# Patient Record
Sex: MALE | URBAN - METROPOLITAN AREA
[De-identification: names, ages, dates, MRNs, and addresses within clinical notes are randomized per-mention and may not be internally consistent; named-entity substitution may affect disease eponyms.]

---

## 2022-05-12 ENCOUNTER — LAB OUTSIDE AN ENCOUNTER (OUTPATIENT)
Dept: URBAN - METROPOLITAN AREA CLINIC 118 | Facility: CLINIC | Age: 67
End: 2022-05-12

## 2022-05-12 ENCOUNTER — OFFICE VISIT (OUTPATIENT)
Dept: URBAN - METROPOLITAN AREA CLINIC 118 | Facility: CLINIC | Age: 67
End: 2022-05-12
Payer: COMMERCIAL

## 2022-05-12 ENCOUNTER — DASHBOARD ENCOUNTERS (OUTPATIENT)
Age: 67
End: 2022-05-12

## 2022-05-12 DIAGNOSIS — Z12.11 SCREEN FOR COLON CANCER: ICD-10-CM

## 2022-05-12 DIAGNOSIS — Z86.010 PERSONAL HISTORY OF COLONIC POLYPS: ICD-10-CM

## 2022-05-12 PROBLEM — 428283002: Status: ACTIVE | Noted: 2022-05-12

## 2022-05-12 PROCEDURE — 99203 OFFICE O/P NEW LOW 30 MIN: CPT | Performed by: INTERNAL MEDICINE

## 2022-05-12 NOTE — HPI-TODAY'S VISIT:
pt presents for colon cancer screening. pt reports h/o polyps. pt denies specific GI complaints at this time. Pt denies diarrhea, constipation or rectal bleeding. No weight loss or anemia. No UGI complaints including nausea, vomiting, gerd or dysphagia. Last colonoscopy with polyps 2017.

## 2022-06-15 ENCOUNTER — OFFICE VISIT (OUTPATIENT)
Dept: URBAN - METROPOLITAN AREA SURGERY CENTER 23 | Facility: SURGERY CENTER | Age: 67
End: 2022-06-15
Payer: COMMERCIAL

## 2022-06-15 DIAGNOSIS — Z86.010 ADENOMAS PERSONAL HISTORY OF COLONIC POLYPS: ICD-10-CM

## 2022-06-15 DIAGNOSIS — Z12.11 COLON CANCER SCREENING: ICD-10-CM

## 2022-06-15 PROCEDURE — G0105 COLORECTAL SCRN; HI RISK IND: HCPCS | Performed by: INTERNAL MEDICINE

## 2022-06-15 PROCEDURE — G8907 PT DOC NO EVENTS ON DISCHARG: HCPCS | Performed by: INTERNAL MEDICINE

## 2024-05-16 ENCOUNTER — APPOINTMENT (OUTPATIENT)
Dept: RADIOLOGY | Facility: HOSPITAL | Age: 69
DRG: 029 | End: 2024-05-16
Payer: MEDICARE

## 2024-05-16 ENCOUNTER — CLINICAL SUPPORT (OUTPATIENT)
Dept: EMERGENCY MEDICINE | Facility: HOSPITAL | Age: 69
DRG: 029 | End: 2024-05-16
Payer: MEDICARE

## 2024-05-16 ENCOUNTER — HOSPITAL ENCOUNTER (INPATIENT)
Facility: HOSPITAL | Age: 69
LOS: 10 days | Discharge: HOME | DRG: 029 | End: 2024-05-27
Attending: EMERGENCY MEDICINE | Admitting: SURGERY
Payer: MEDICARE

## 2024-05-16 DIAGNOSIS — M48.02 CERVICAL STENOSIS OF SPINAL CANAL: ICD-10-CM
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
## 2024-05-16 DIAGNOSIS — R55 SYNCOPE, UNSPECIFIED SYNCOPE TYPE: ICD-10-CM

## 2024-05-16 DIAGNOSIS — F14.10 COCAINE ABUSE (MULTI): ICD-10-CM

## 2024-05-16 DIAGNOSIS — S14.129A: ICD-10-CM

## 2024-05-16 DIAGNOSIS — S14.129A CENTRAL CORD SYNDROME, INITIAL ENCOUNTER (MULTI): ICD-10-CM

## 2024-05-16 DIAGNOSIS — G95.20 CERVICAL CORD COMPRESSION WITH MYELOPATHY (MULTI): ICD-10-CM

## 2024-05-16 LAB
ABO GROUP (TYPE) IN BLOOD: NORMAL
ABO GROUP (TYPE) IN BLOOD: NORMAL
ALBUMIN SERPL BCP-MCNC: 3.8 G/DL (ref 3.4–5)
ALP SERPL-CCNC: 83 U/L (ref 33–136)
ALT SERPL W P-5'-P-CCNC: 11 U/L (ref 10–52)
AMPHETAMINES UR QL SCN: ABNORMAL
ANION GAP BLDV CALCULATED.4IONS-SCNC: 5 MMOL/L (ref 10–25)
ANION GAP SERPL CALC-SCNC: 14 MMOL/L (ref 10–20)
ANTIBODY SCREEN: NORMAL
AST SERPL W P-5'-P-CCNC: 16 U/L (ref 9–39)
ATRIAL RATE: 84 BPM
BARBITURATES UR QL SCN: ABNORMAL
BASE EXCESS BLDV CALC-SCNC: 3.7 MMOL/L (ref -2–3)
BASOPHILS # BLD MANUAL: 0.06 X10*3/UL (ref 0–0.1)
BASOPHILS NFR BLD MANUAL: 0.9 %
BENZODIAZ UR QL SCN: ABNORMAL
BILIRUB SERPL-MCNC: 0.8 MG/DL (ref 0–1.2)
BODY TEMPERATURE: 37 DEGREES CELSIUS
BUN SERPL-MCNC: 12 MG/DL (ref 6–23)
BZE UR QL SCN: ABNORMAL
CA-I BLDV-SCNC: 1.1 MMOL/L (ref 1.1–1.33)
CALCIUM SERPL-MCNC: 8.2 MG/DL (ref 8.6–10.6)
CANNABINOIDS UR QL SCN: ABNORMAL
CARDIAC TROPONIN I PNL SERPL HS: 6 NG/L (ref 0–53)
CHLORIDE BLDV-SCNC: 104 MMOL/L (ref 98–107)
CHLORIDE SERPL-SCNC: 102 MMOL/L (ref 98–107)
CO2 SERPL-SCNC: 26 MMOL/L (ref 21–32)
CREAT SERPL-MCNC: 0.97 MG/DL (ref 0.5–1.3)
EGFRCR SERPLBLD CKD-EPI 2021: 85 ML/MIN/1.73M*2
EOSINOPHIL # BLD MANUAL: 0.06 X10*3/UL (ref 0–0.7)
EOSINOPHIL NFR BLD MANUAL: 0.9 %
ERYTHROCYTE [DISTWIDTH] IN BLOOD BY AUTOMATED COUNT: 14.4 % (ref 11.5–14.5)
ETHANOL SERPL-MCNC: <10 MG/DL
FENTANYL+NORFENTANYL UR QL SCN: ABNORMAL
GLUCOSE BLD MANUAL STRIP-MCNC: 110 MG/DL (ref 74–99)
GLUCOSE BLD MANUAL STRIP-MCNC: 76 MG/DL (ref 74–99)
GLUCOSE BLDV-MCNC: 87 MG/DL (ref 74–99)
GLUCOSE SERPL-MCNC: 82 MG/DL (ref 74–99)
HCO3 BLDV-SCNC: 29.7 MMOL/L (ref 22–26)
HCT VFR BLD AUTO: 38.6 % (ref 41–52)
HCT VFR BLD EST: 41 % (ref 41–52)
HGB BLD-MCNC: 13.4 G/DL (ref 13.5–17.5)
HGB BLDV-MCNC: 13.6 G/DL (ref 13.5–17.5)
IMM GRANULOCYTES # BLD AUTO: 0.03 X10*3/UL (ref 0–0.7)
IMM GRANULOCYTES NFR BLD AUTO: 0.5 % (ref 0–0.9)
INR PPP: 0.9 (ref 0.9–1.1)
LACTATE BLDV-SCNC: 1 MMOL/L (ref 0.4–2)
LYMPHOCYTES # BLD MANUAL: 2.53 X10*3/UL (ref 1.2–4.8)
LYMPHOCYTES NFR BLD MANUAL: 39.5 %
MCH RBC QN AUTO: 29.9 PG (ref 26–34)
MCHC RBC AUTO-ENTMCNC: 34.7 G/DL (ref 32–36)
MCV RBC AUTO: 86 FL (ref 80–100)
METHADONE UR QL SCN: ABNORMAL
MONOCYTES # BLD MANUAL: 0.9 X10*3/UL (ref 0.1–1)
MONOCYTES NFR BLD MANUAL: 14 %
NEUTS SEG # BLD MANUAL: 2.86 X10*3/UL (ref 1.2–7)
NEUTS SEG NFR BLD MANUAL: 44.7 %
NRBC BLD-RTO: 0 /100 WBCS (ref 0–0)
OPIATES UR QL SCN: ABNORMAL
OXYCODONE+OXYMORPHONE UR QL SCN: ABNORMAL
OXYHGB MFR BLDV: 49.3 % (ref 45–75)
P AXIS: 81 DEGREES
P OFFSET: 187 MS
P ONSET: 141 MS
PCO2 BLDV: 49 MM HG (ref 41–51)
PCP UR QL SCN: ABNORMAL
PH BLDV: 7.39 PH (ref 7.33–7.43)
PLATELET # BLD AUTO: 80 X10*3/UL (ref 150–450)
PO2 BLDV: 32 MM HG (ref 35–45)
POTASSIUM BLDV-SCNC: 4.4 MMOL/L (ref 3.5–5.3)
POTASSIUM SERPL-SCNC: 3.6 MMOL/L (ref 3.5–5.3)
PR INTERVAL: 170 MS
PROT SERPL-MCNC: 6.9 G/DL (ref 6.4–8.2)
PROTHROMBIN TIME: 10.6 SECONDS (ref 9.8–12.8)
Q ONSET: 226 MS
QRS COUNT: 13 BEATS
QRS DURATION: 70 MS
QT INTERVAL: 390 MS
QTC CALCULATION(BAZETT): 460 MS
QTC FREDERICIA: 436 MS
R AXIS: -18 DEGREES
RBC # BLD AUTO: 4.48 X10*6/UL (ref 4.5–5.9)
RBC MORPH BLD: NORMAL
RH FACTOR (ANTIGEN D): NORMAL
RH FACTOR (ANTIGEN D): NORMAL
SAO2 % BLDV: 54 % (ref 45–75)
SODIUM BLDV-SCNC: 134 MMOL/L (ref 136–145)
SODIUM SERPL-SCNC: 138 MMOL/L (ref 136–145)
T AXIS: 69 DEGREES
T OFFSET: 421 MS
TOTAL CELLS COUNTED BLD: 114
VENTRICULAR RATE: 84 BPM
WBC # BLD AUTO: 6.4 X10*3/UL (ref 4.4–11.3)

## 2024-05-16 PROCEDURE — 74177 CT ABD & PELVIS W/CONTRAST: CPT | Performed by: RADIOLOGY

## 2024-05-16 PROCEDURE — 82947 ASSAY GLUCOSE BLOOD QUANT: CPT | Mod: 91

## 2024-05-16 PROCEDURE — G0390 TRAUMA RESPONS W/HOSP CRITI: HCPCS

## 2024-05-16 PROCEDURE — 86901 BLOOD TYPING SEROLOGIC RH(D): CPT | Performed by: EMERGENCY MEDICINE

## 2024-05-16 PROCEDURE — 72170 X-RAY EXAM OF PELVIS: CPT

## 2024-05-16 PROCEDURE — 2550000001 HC RX 255 CONTRASTS: Performed by: EMERGENCY MEDICINE

## 2024-05-16 PROCEDURE — 36415 COLL VENOUS BLD VENIPUNCTURE: CPT | Performed by: EMERGENCY MEDICINE

## 2024-05-16 PROCEDURE — 2500000004 HC RX 250 GENERAL PHARMACY W/ HCPCS (ALT 636 FOR OP/ED): Mod: SE | Performed by: NURSE PRACTITIONER

## 2024-05-16 PROCEDURE — 99291 CRITICAL CARE FIRST HOUR: CPT | Mod: 25 | Performed by: EMERGENCY MEDICINE

## 2024-05-16 PROCEDURE — 70486 CT MAXILLOFACIAL W/O DYE: CPT

## 2024-05-16 PROCEDURE — 96360 HYDRATION IV INFUSION INIT: CPT

## 2024-05-16 PROCEDURE — 71260 CT THORAX DX C+: CPT | Performed by: RADIOLOGY

## 2024-05-16 PROCEDURE — 72131 CT LUMBAR SPINE W/O DYE: CPT | Performed by: RADIOLOGY

## 2024-05-16 PROCEDURE — 93005 ELECTROCARDIOGRAM TRACING: CPT

## 2024-05-16 PROCEDURE — 71045 X-RAY EXAM CHEST 1 VIEW: CPT

## 2024-05-16 PROCEDURE — 71045 X-RAY EXAM CHEST 1 VIEW: CPT | Performed by: RADIOLOGY

## 2024-05-16 PROCEDURE — 80053 COMPREHEN METABOLIC PANEL: CPT | Performed by: EMERGENCY MEDICINE

## 2024-05-16 PROCEDURE — 36415 COLL VENOUS BLD VENIPUNCTURE: CPT

## 2024-05-16 PROCEDURE — 72050 X-RAY EXAM NECK SPINE 4/5VWS: CPT | Performed by: INTERNAL MEDICINE

## 2024-05-16 PROCEDURE — 99221 1ST HOSP IP/OBS SF/LOW 40: CPT | Performed by: ORTHOPAEDIC SURGERY

## 2024-05-16 PROCEDURE — 82947 ASSAY GLUCOSE BLOOD QUANT: CPT

## 2024-05-16 PROCEDURE — 2500000004 HC RX 250 GENERAL PHARMACY W/ HCPCS (ALT 636 FOR OP/ED): Mod: SE | Performed by: PHYSICIAN ASSISTANT

## 2024-05-16 PROCEDURE — 96361 HYDRATE IV INFUSION ADD-ON: CPT

## 2024-05-16 PROCEDURE — 76376 3D RENDER W/INTRP POSTPROCES: CPT

## 2024-05-16 PROCEDURE — 72125 CT NECK SPINE W/O DYE: CPT

## 2024-05-16 PROCEDURE — 72050 X-RAY EXAM NECK SPINE 4/5VWS: CPT

## 2024-05-16 PROCEDURE — 72128 CT CHEST SPINE W/O DYE: CPT | Mod: RCN

## 2024-05-16 PROCEDURE — 82077 ASSAY SPEC XCP UR&BREATH IA: CPT | Performed by: EMERGENCY MEDICINE

## 2024-05-16 PROCEDURE — 72156 MRI NECK SPINE W/O & W/DYE: CPT

## 2024-05-16 PROCEDURE — 85027 COMPLETE CBC AUTOMATED: CPT | Performed by: EMERGENCY MEDICINE

## 2024-05-16 PROCEDURE — G0378 HOSPITAL OBSERVATION PER HR: HCPCS

## 2024-05-16 PROCEDURE — 72156 MRI NECK SPINE W/O & W/DYE: CPT | Performed by: RADIOLOGY

## 2024-05-16 PROCEDURE — 85610 PROTHROMBIN TIME: CPT | Performed by: EMERGENCY MEDICINE

## 2024-05-16 PROCEDURE — 80307 DRUG TEST PRSMV CHEM ANLYZR: CPT | Performed by: PHYSICIAN ASSISTANT

## 2024-05-16 PROCEDURE — 84132 ASSAY OF SERUM POTASSIUM: CPT | Mod: 91

## 2024-05-16 PROCEDURE — 70553 MRI BRAIN STEM W/O & W/DYE: CPT

## 2024-05-16 PROCEDURE — 72128 CT CHEST SPINE W/O DYE: CPT | Performed by: RADIOLOGY

## 2024-05-16 PROCEDURE — 72131 CT LUMBAR SPINE W/O DYE: CPT | Mod: RCN

## 2024-05-16 PROCEDURE — 70450 CT HEAD/BRAIN W/O DYE: CPT

## 2024-05-16 PROCEDURE — 84484 ASSAY OF TROPONIN QUANT: CPT | Performed by: EMERGENCY MEDICINE

## 2024-05-16 PROCEDURE — 85007 BL SMEAR W/DIFF WBC COUNT: CPT | Performed by: EMERGENCY MEDICINE

## 2024-05-16 PROCEDURE — 99291 CRITICAL CARE FIRST HOUR: CPT | Performed by: EMERGENCY MEDICINE

## 2024-05-16 PROCEDURE — 70553 MRI BRAIN STEM W/O & W/DYE: CPT | Performed by: RADIOLOGY

## 2024-05-16 PROCEDURE — 93010 ELECTROCARDIOGRAM REPORT: CPT | Performed by: EMERGENCY MEDICINE

## 2024-05-16 PROCEDURE — 72170 X-RAY EXAM OF PELVIS: CPT | Performed by: RADIOLOGY

## 2024-05-16 PROCEDURE — 71260 CT THORAX DX C+: CPT

## 2024-05-16 PROCEDURE — 2550000001 HC RX 255 CONTRASTS: Mod: SE | Performed by: EMERGENCY MEDICINE

## 2024-05-16 PROCEDURE — A9575 INJ GADOTERATE MEGLUMI 0.1ML: HCPCS | Mod: SE | Performed by: EMERGENCY MEDICINE

## 2024-05-16 RX ORDER — SODIUM CHLORIDE, SODIUM LACTATE, POTASSIUM CHLORIDE, CALCIUM CHLORIDE 600; 310; 30; 20 MG/100ML; MG/100ML; MG/100ML; MG/100ML
100 INJECTION, SOLUTION INTRAVENOUS CONTINUOUS
Status: DISCONTINUED | OUTPATIENT
Start: 2024-05-16 | End: 2024-05-17

## 2024-05-16 RX ORDER — DOCUSATE SODIUM 100 MG/1
100 CAPSULE, LIQUID FILLED ORAL 2 TIMES DAILY
Status: DISCONTINUED | OUTPATIENT
Start: 2024-05-16 | End: 2024-05-27 | Stop reason: HOSPADM

## 2024-05-16 RX ORDER — GADOTERATE MEGLUMINE 376.9 MG/ML
12 INJECTION INTRAVENOUS
Status: COMPLETED | OUTPATIENT
Start: 2024-05-16 | End: 2024-05-16

## 2024-05-16 RX ORDER — ACETAMINOPHEN 325 MG/1
650 TABLET ORAL EVERY 6 HOURS PRN
Status: DISCONTINUED | OUTPATIENT
Start: 2024-05-16 | End: 2024-05-27 | Stop reason: HOSPADM

## 2024-05-16 RX ORDER — SENNOSIDES 8.6 MG/1
1 TABLET ORAL 2 TIMES DAILY
Status: DISCONTINUED | OUTPATIENT
Start: 2024-05-16 | End: 2024-05-27 | Stop reason: HOSPADM

## 2024-05-16 RX ORDER — POLYETHYLENE GLYCOL 3350 17 G/17G
17 POWDER, FOR SOLUTION ORAL DAILY PRN
Status: DISCONTINUED | OUTPATIENT
Start: 2024-05-16 | End: 2024-05-22

## 2024-05-16 RX ORDER — HYDRALAZINE HYDROCHLORIDE 20 MG/ML
10 INJECTION INTRAMUSCULAR; INTRAVENOUS ONCE AS NEEDED
Status: COMPLETED | OUTPATIENT
Start: 2024-05-16 | End: 2024-05-16

## 2024-05-16 RX ADMIN — SODIUM CHLORIDE, POTASSIUM CHLORIDE, SODIUM LACTATE AND CALCIUM CHLORIDE 100 ML/HR: 600; 310; 30; 20 INJECTION, SOLUTION INTRAVENOUS at 21:38

## 2024-05-16 RX ADMIN — SODIUM CHLORIDE, POTASSIUM CHLORIDE, SODIUM LACTATE AND CALCIUM CHLORIDE 100 ML/HR: 600; 310; 30; 20 INJECTION, SOLUTION INTRAVENOUS at 16:36

## 2024-05-16 RX ADMIN — GADOTERATE MEGLUMINE 12 ML: 376.9 INJECTION INTRAVENOUS at 09:59

## 2024-05-16 RX ADMIN — HYDRALAZINE HYDROCHLORIDE 10 MG: 20 INJECTION INTRAMUSCULAR; INTRAVENOUS at 20:27

## 2024-05-16 RX ADMIN — IOHEXOL 100 ML: 350 INJECTION, SOLUTION INTRAVENOUS at 02:20

## 2024-05-16 SDOH — SOCIAL STABILITY: SOCIAL INSECURITY: DO YOU FEEL UNSAFE GOING BACK TO THE PLACE WHERE YOU ARE LIVING?: NO

## 2024-05-16 SDOH — SOCIAL STABILITY: SOCIAL INSECURITY: WERE YOU ABLE TO COMPLETE ALL THE BEHAVIORAL HEALTH SCREENINGS?: YES

## 2024-05-16 SDOH — SOCIAL STABILITY: SOCIAL INSECURITY: DOES ANYONE TRY TO KEEP YOU FROM HAVING/CONTACTING OTHER FRIENDS OR DOING THINGS OUTSIDE YOUR HOME?: NO

## 2024-05-16 SDOH — SOCIAL STABILITY: SOCIAL INSECURITY: HAVE YOU HAD ANY THOUGHTS OF HARMING ANYONE ELSE?: NO

## 2024-05-16 SDOH — SOCIAL STABILITY: SOCIAL INSECURITY: ARE THERE ANY APPARENT SIGNS OF INJURIES/BEHAVIORS THAT COULD BE RELATED TO ABUSE/NEGLECT?: NO

## 2024-05-16 SDOH — SOCIAL STABILITY: SOCIAL INSECURITY: HAS ANYONE EVER THREATENED TO HURT YOUR FAMILY OR YOUR PETS?: NO

## 2024-05-16 SDOH — SOCIAL STABILITY: SOCIAL INSECURITY: ABUSE: ADULT

## 2024-05-16 SDOH — SOCIAL STABILITY: SOCIAL INSECURITY: DO YOU FEEL ANYONE HAS EXPLOITED OR TAKEN ADVANTAGE OF YOU FINANCIALLY OR OF YOUR PERSONAL PROPERTY?: NO

## 2024-05-16 SDOH — SOCIAL STABILITY: SOCIAL INSECURITY: HAVE YOU HAD THOUGHTS OF HARMING ANYONE ELSE?: NO

## 2024-05-16 SDOH — SOCIAL STABILITY: SOCIAL INSECURITY: ARE YOU OR HAVE YOU BEEN THREATENED OR ABUSED PHYSICALLY, EMOTIONALLY, OR SEXUALLY BY ANYONE?: NO

## 2024-05-16 ASSESSMENT — ACTIVITIES OF DAILY LIVING (ADL)
BATHING: INDEPENDENT
JUDGMENT_ADEQUATE_SAFELY_COMPLETE_DAILY_ACTIVITIES: YES
FEEDING YOURSELF: INDEPENDENT
ADEQUATE_TO_COMPLETE_ADL: YES
DRESSING YOURSELF: INDEPENDENT
HEARING - LEFT EAR: FUNCTIONAL
WALKS IN HOME: INDEPENDENT
HEARING - RIGHT EAR: FUNCTIONAL
GROOMING: INDEPENDENT
TOILETING: INDEPENDENT
PATIENT'S MEMORY ADEQUATE TO SAFELY COMPLETE DAILY ACTIVITIES?: YES
LACK_OF_TRANSPORTATION: NO

## 2024-05-16 ASSESSMENT — LIFESTYLE VARIABLES
HOW MANY STANDARD DRINKS CONTAINING ALCOHOL DO YOU HAVE ON A TYPICAL DAY: PATIENT DOES NOT DRINK
HOW OFTEN DO YOU HAVE A DRINK CONTAINING ALCOHOL: NEVER
EVER HAD A DRINK FIRST THING IN THE MORNING TO STEADY YOUR NERVES TO GET RID OF A HANGOVER: NO
AUDIT-C TOTAL SCORE: 0
HAVE PEOPLE ANNOYED YOU BY CRITICIZING YOUR DRINKING: NO
PRESCIPTION_ABUSE_PAST_12_MONTHS: NO
SKIP TO QUESTIONS 9-10: 1
TOTAL SCORE: 0
EVER FELT BAD OR GUILTY ABOUT YOUR DRINKING: NO
HOW OFTEN DO YOU HAVE 6 OR MORE DRINKS ON ONE OCCASION: NEVER
AUDIT-C TOTAL SCORE: 0
HAVE YOU EVER FELT YOU SHOULD CUT DOWN ON YOUR DRINKING: NO
SUBSTANCE_ABUSE_PAST_12_MONTHS: YES

## 2024-05-16 ASSESSMENT — COGNITIVE AND FUNCTIONAL STATUS - GENERAL
DAILY ACTIVITIY SCORE: 24
MOBILITY SCORE: 24
PATIENT BASELINE BEDBOUND: NO

## 2024-05-16 ASSESSMENT — PATIENT HEALTH QUESTIONNAIRE - PHQ9
2. FEELING DOWN, DEPRESSED OR HOPELESS: NOT AT ALL
1. LITTLE INTEREST OR PLEASURE IN DOING THINGS: NOT AT ALL
SUM OF ALL RESPONSES TO PHQ9 QUESTIONS 1 & 2: 0

## 2024-05-16 ASSESSMENT — PAIN - FUNCTIONAL ASSESSMENT
PAIN_FUNCTIONAL_ASSESSMENT: 0-10
PAIN_FUNCTIONAL_ASSESSMENT: 0-10

## 2024-05-16 ASSESSMENT — ENCOUNTER SYMPTOMS
NUMBNESS: 1
MUSCULOSKELETAL NEGATIVE: 1
CARDIOVASCULAR NEGATIVE: 1
RESPIRATORY NEGATIVE: 1
GASTROINTESTINAL NEGATIVE: 1
EYES NEGATIVE: 1
CONSTITUTIONAL NEGATIVE: 1
ENDOCRINE NEGATIVE: 1

## 2024-05-16 ASSESSMENT — PAIN SCALES - GENERAL
PAINLEVEL_OUTOF10: 0 - NO PAIN
PAINLEVEL_OUTOF10: 0 - NO PAIN

## 2024-05-16 NOTE — H&P
[facility]  TRAUMA SERVICE - HISTORY AND PHYSICAL / CONSULT    Patient Name: Jimmy Carpenter  MRN: [id number]  Admit Date: 516  : 1955  AGE: 69 y.o.   GENDER: male  ==============================================================================  MECHANISM OF INJURY / CHIEF COMPLAINT:   68 yo M fall from standing, unclear mechanical vs. Syncopal. Fell striking face, unknown LOC. Pt. Endorses using cocaine today.   LOC (yes/no?): Unknown.   Anticoagulant / Anti-platelet Rx? (for what dx?): No  Referring Facility Name (N/A for scene EMR run): NA    Injuries/problems:  LUE weakness with severe cervical stenosis and mild cord compression  Mildly displaced L nasal bone fx  Cocaine use d/o    INCIDENTAL FINDINGS:  prox sma filing defect (possible thrombus vs. Chronic dissection flap)  ==============================================================================  ADMISSION PLAN OF CARE:    ## LUE weakness with cervical stenosis and mild cord compression as noted on MRI  - Ortho spine consulted: written recs pending, but plan to take to OR tomorrow for decompression, flex/ex Xrs pending, q1 hr neuro checks  - Maintain c-spine precautions in [place] collar  - diet, npo at [place]  - hold chemoppx  - tylenol as needed    ## nasal bone fracture  - ENT outpatient follow up as needed, no in house consult    Dispo: Trauma ICU admission, MEDICALLY CLEAR FOR OR.     Pt. Seen and discussed with Dr. Fernandez and Dr. Magalis Mueller, APRN-CNP  Nas Lugo PA-C  Trauma Surgery  [id number]    Total face to face time spent with patient/family of 30 minutes, with >50% of the time spent discussing plan of care/management, counseling/educating on disease processes, explaining results of diagnostic testing.             ==============================================================================  PAST MEDICAL HISTORY:   PMH: Denies      PSH: Denies    FH: Not pertinent to current traumatic events      SOCIAL HISTORY:    Smoking: Denies   Social History     Tobacco Use   Smoking Status Not on file   Smokeless Tobacco Not on file       Alcohol: Denies    Social History     Substance and Sexual Activity   Alcohol Use Not on file       Drug use: Used cocaine today     MEDICATIONS: Denies   Prior to Admission medications    Not on File     ALLERGIES:   No Known Allergies    REVIEW OF SYSTEMS:  Review of Systems   Constitutional: Negative.    HENT: Negative.     Eyes: Negative.    Respiratory: Negative.     Cardiovascular: Negative.    Gastrointestinal: Negative.    Endocrine: Negative.    Genitourinary: Negative.    Musculoskeletal: Negative.    Neurological:  Positive for numbness.        LUE numbness/ tingling      PHYSICAL EXAM:  PRIMARY SURVEY:  Airway  Airway is patent.     Breathing  Breathing is normal. Right breath sounds are normal. Left breath sounds are normal.     Circulation  Cardiac rhythm is regular. Rate is regular.   Pulses  Radial: 2+ on the right; 2+ on the left.  Femoral: 2+ on the right; 2+ on the left.  Pedal: 2+ on the right; 2+ on the left.    Disability  [place] Coma Score  Eye:4   Verbal:5   Motor:6      15  Pupils  Right Pupil:   round and reactive        Left Pupil:   round and reactive           Motor Strength   strength:  5/5 on the right  4/5 on the left  Dorsiflex strength:  5/5 on the right  5/5 on the left  Plantarflex strength:  5/5 on the right  5/5 on the left  The patient has a sensory deficit (Numbness/ tingling in LUE).       SECONDARY SURVEY/PHYSICAL EXAM:  Physical Exam  Vitals reviewed.   HENT:      Head: Normocephalic.      Comments: 2 cm above right eyebrow     Nose:      Comments: 1cm laceration to nasal bridge   No blood nares      Mouth/Throat:      Mouth: Mucous membranes are moist.      Comments: Poor dentition  No blood in oropharynx   Eyes:      Extraocular Movements: Extraocular movements intact.      Pupils: Pupils are equal, round, and reactive to light.    Neck:      Comments: Cervical spine nontender to palpations, no step offs, no deformities. Field collar switched to aspen.   Cardiovascular:      Rate and Rhythm: Normal rate.      Pulses: Normal pulses.   Pulmonary:      Comments: Chest wall nontender, no crepitus, no deformities.   Abdominal:      Comments: Abd soft, nontender, nondistended. Pelvis stable to compression.   Positive rectal tone, no blood.    Genitourinary:     Comments: No blood at urethral meatus   Musculoskeletal:      Comments: T/L spine nontender, no step offs, no deformities    Skin:     General: Skin is warm and dry.      Capillary Refill: Capillary refill takes less than 2 seconds.   Neurological:      Mental Status: He is alert and oriented to person, place, and time.   Psychiatric:         Mood and Affect: Mood normal.         Behavior: Behavior normal.       IMAGING SUMMARY:  see above     LABS:          Results from last 7 days   Lab Units 05/16/24  0152   SODIUM mmol/L 138   POTASSIUM mmol/L 3.6   CHLORIDE mmol/L 102   CO2 mmol/L 26   BUN mg/dL 12   CREATININE mg/dL 0.97   CALCIUM mg/dL 8.2*   PROTEIN TOTAL g/dL 6.9   BILIRUBIN TOTAL mg/dL 0.8   ALK PHOS U/L 83   ALT U/L 11   AST U/L 16   GLUCOSE mg/dL 82               I have reviewed all laboratory and imaging results ordered/pertinent for this encounter.

## 2024-05-16 NOTE — ED PROCEDURE NOTE
Procedure  Critical Care    Performed by: Jeffery Schumacher MD  Authorized by: Jeffery Schumacher MD    Critical care provider statement:     Critical care time (minutes):  34    Critical care time was exclusive of:  Separately billable procedures and treating other patients and teaching time    Critical care was necessary to treat or prevent imminent or life-threatening deterioration of the following conditions:  Trauma    Critical care was time spent personally by me on the following activities:  Development of treatment plan with patient or surrogate, evaluation of patient's response to treatment, examination of patient, ordering and performing treatments and interventions, ordering and review of radiographic studies, pulse oximetry, re-evaluation of patient's condition and obtaining history from patient or surrogate               Jeffery Schumacher MD  05/16/24 0316

## 2024-05-16 NOTE — PROGRESS NOTES
Limited: Fall    Pt is a 69 year old male presenting to the ED following a fall. Pt states he was walking to his sister's home when he fell. Pt did strike his head, +LOC. Pt presenting to the ED with a lac to the nose and a lac and abrasion above the right eye. SW left voicemail for pt's sister Lauren [phone] notifying her of pt's presentation to the ED.     Plan: pending    KRYSTAL Pulido     Secure Chat  [phone]

## 2024-05-16 NOTE — PROGRESS NOTES
[facility]  TRAUMA ICU - PROGRESS NOTE    Patient Name: Jimmy Carpenter  MRN: [id number]  Admit Date: 516  : 1955  AGE: 69 y.o.   GENDER: male  ==============================================================================  MECHANISM OF INJURY / CHIEF COMPLAINT:   68 yo M fall from standing, unclear mechanical vs. Syncopal. Fell striking face, unknown LOC. Pt. Endorses using cocaine today.   LOC (yes/no?): Unknown.   Anticoagulant / Anti-platelet Rx? (for what dx?): No  Referring Facility Name (N/A for scene EMR run): NA     Injuries/problems:  LUE weakness with severe cervical stenosis and mild cord compression  Mildly displaced L nasal bone fx  Cocaine use d/o     INCIDENTAL FINDINGS:  prox sma filing defect (possible thrombus vs. Chronic dissection flap)    PROCEDURES:  OR tomorrow with Orthospine       ==============================================================================  TODAY'S ASSESSMENT AND PLAN OF CARE:  Jimmy Carpenter is a 69 y.o. male in the ICU due to: Neurochecks every 1 hour    NEURO/PAIN/SEDATION: Orthospine consulted, OR in am for severe canal stenosis of the cervical spine. Tylenol as needed for pain control     RESPIRATORY: IS. Continuous pulse ox.     CARDIOVASC: Continuous cardiac monitoring.     GI: Reg diet. BR. NPO at MN for OR tomorrow.     /FEN: Strict I/Os. AM labs. Replete electrolytes as indicated. MIVF.    HEMATOLOGIC: Monitor for signs and symptoms of acute blood loss anemia.     ENDOCRINE: Blood glucose checks every 6 hours to monitor for hypoglycemia.     MUSCULOSKELETAL/SKIN: PT/OT when able. Strict cervical spine precautions. Maintain aspen collar.     INFECTIOUS DISEASE: No active issues.     GI PROPHYLAXIS: Not indicated.     DVT PROPHYLAXIS: Hold lvx in the setting spine surgery. SCDs.     DISPOSITION: Admit to TSICU. OR in am.     Pt. Seen and discussed with Dr. Hart.     DIMITRIOS Barkley-CNP  Trauma  Surgery  [id number]    Total face to face time spent with patient/family of 31 minutes critical care time, with >50% of the time spent discussing plan of care/management, counseling/educating on disease processes, explaining results of diagnostic testing.       ==============================================================================  CHIEF COMPLAINT / OVERNIGHT EVENTS / HPI:   Admitted for neuro checks every 1 hour. OR tomorrow with Ortho.     MEDICAL HISTORY / ROS:  Admission history and ROS reviewed. Pertinent changes as follows:  Complaint of numbness/ tingling LUE.     PHYSICAL EXAM:  Heart Rate:  [65-90]   Temp:  [36.4 °C (97.5 °F)-37.4 °C (99.3 °F)]   Resp:  [13-22]   BP: (155-186)/()   SpO2:  [92 %-98 %]   Physical Exam  Vitals reviewed.   Constitutional:       General: He is not in acute distress.     Appearance: He is not ill-appearing.      Comments: AOx3   HENT:      Head: Normocephalic.      Comments: 2 cm superficial laceration above right eyebrow     Right Ear: External ear normal.      Left Ear: External ear normal.      Nose:      Comments: 1cm superficial lac to nasal bridge      Mouth/Throat:      Mouth: Mucous membranes are moist.      Pharynx: Oropharynx is clear.   Eyes:      Extraocular Movements: Extraocular movements intact.      Pupils: Pupils are equal, round, and reactive to light.   Neck:      Comments: Aspen collar in place  Cardiovascular:      Rate and Rhythm: Normal rate.   Pulmonary:      Breath sounds: Normal breath sounds.      Comments: On room air. Respirations even and unlabored. Thorax symmetric.   Abdominal:      General: There is no distension.      Palpations: Abdomen is soft.      Tenderness: There is no abdominal tenderness.   Genitourinary:     Comments: External catheter in place  Musculoskeletal:         General: No swelling, tenderness or deformity.      Right lower leg: No edema.      Left lower leg: No edema.   Skin:     General: Skin is warm.      Capillary  Refill: Capillary refill takes less than 2 seconds.   Neurological:      Mental Status: He is alert and oriented to person, place, and time.      Comments: RUE and BLE 5/5 strength. Numbness/ tingling in LUE. LUE  strength 4/5. Able to flex extend left forearm, unable to lift left elbow off bed. Positive sensation throughout.    Psychiatric:         Mood and Affect: Mood normal.         Behavior: Behavior normal.             LABS:  Results from last 7 days   Lab Units 05/16/24  0152   WBC AUTO x10*3/uL 6.4   HEMOGLOBIN g/dL 13.4*   HEMATOCRIT % 38.6*   PLATELETS AUTO x10*3/uL 80*   LYMPHO PCT MAN % 39.5   MONO PCT MAN % 14.0   EOSINO PCT MAN % 0.9     Results from last 7 days   Lab Units 05/16/24  0152   INR  0.9     Results from last 7 days   Lab Units 05/16/24  0152   SODIUM mmol/L 138   POTASSIUM mmol/L 3.6   CHLORIDE mmol/L 102   CO2 mmol/L 26   BUN mg/dL 12   CREATININE mg/dL 0.97   CALCIUM mg/dL 8.2*   PROTEIN TOTAL g/dL 6.9   BILIRUBIN TOTAL mg/dL 0.8   ALK PHOS U/L 83   ALT U/L 11   AST U/L 16   GLUCOSE mg/dL 82     Results from last 7 days   Lab Units 05/16/24  0152   BILIRUBIN TOTAL mg/dL 0.8         I have reviewed all medications, laboratory results, and imaging pertinent for today's encounter.

## 2024-05-16 NOTE — PROGRESS NOTES
ALICIA received call from Lauren requesting an update on patient. ALICIA informed her plan is for patient to be admitted to inpatient. She is requesting patient call her at [phone]. Alicia sent secure chat to bedside nurse.

## 2024-05-16 NOTE — HOSPITAL COURSE
69M without medical problems diagnosed presents s/p fall from standing, +cocaine. Presented with paresthesias and mild weakness LUE. Pan scan without acute traumatic injuries, degenerative changes noted in c-spine region. MRI obtained, showing stenosis and cord compression. Ortho spine consulted, taking patient to OR the following day for decompression. Admitted to trauma service in interim. To ICU for close neurological monitoring.     Taken to OR for C3-C5 laminoplasty. Post-operatively with similar weakness L hand. To require soft collar without other restrictions. Rec spine cord rehab per therapy. Tolerating PO with adequate spontaneous bowel/bladder function.     While in house, mobility improved. Rec for home care with walker. Vascular surgery consulted for asymptomatic possible dissection flap, rec no acute intervention or follow up. Vascular medicine rec aspirin 81 mg BID, refrain from further cocaine (patient refusing resources, but adamant that will quit) and follow up Dr. Llanos. Discharged home with family.

## 2024-05-16 NOTE — ED PROVIDER NOTES
HPI   No chief complaint on file.      HPI     Patient is a 69-year-old male who denies relevant past medical history presenting to the emergency department as a limited trauma activation following a fall from standing height.  Per report from EMS, patient had a witnessed fall where he suddenly lost consciousness, fell forward onto his face, then immediately regained consciousness.  Had multiple abrasions to his face.  Then began complaining of weakness and numbness worst over his left arm but including his right arm.  Was placed in a c-collar and spinal precautions in the field and taken to the emergency department for further evaluation.  Patient is hemodynamically stable, GCS of 15, 4 out of 5  strength in the left hand, 5 out of 5  strength in the right hand, normal rectal tone, 5 out of 5 strength of bilateral dorsiflexion, plantarflexion, able to hold his bilateral legs up for 10 seconds.  Other than the facial abrasions, primary exam intact, secondary exam intact.  X-rays of the chest and pelvis grossly normal in the trauma bay.  Patient stated his last tetanus was 1 year ago, so is up-to-date.               [place] Coma Scale Score: 15                     Patient History   No past medical history on file.  No past surgical history on file.  No family history on file.  Social History     Tobacco Use    Smoking status: Not on file    Smokeless tobacco: Not on file   Substance Use Topics    Alcohol use: Not on file    Drug use: Not on file       Physical Exam   ED Triage Vitals   Temperature Heart Rate Respirations BP   05/16/24 0146 05/16/24 0145 05/16/24 0148 05/16/24 0145   36.4 °C (97.5 °F) 85 20 156/90      Pulse Ox Temp src Heart Rate Source Patient Position   05/16/24 0146 -- -- --   (!) 92 %         BP Location FiO2 (%)     -- --             Physical Exam  Constitutional:       Appearance: He is normal weight. He is not ill-appearing, toxic-appearing or diaphoretic.   HENT:      Head:       Comments: Scattered facial abrasions are present.     Nose: Nose normal.   Eyes:      General: No scleral icterus.        Right eye: No discharge.         Left eye: No discharge.      Conjunctiva/sclera: Conjunctivae normal.   Neck:      Comments: C-collar placed.  Cardiovascular:      Rate and Rhythm: Normal rate.      Heart sounds: No murmur heard.     No friction rub. No gallop.   Pulmonary:      Effort: No respiratory distress.      Breath sounds: Normal breath sounds.   Abdominal:      General: There is no distension.      Palpations: Abdomen is soft.   Musculoskeletal:      Cervical back: Neck supple. No rigidity.      Comments: 2+ bilateral radial, femoral, DP pulses.  Scattered facial abrasions and tender to palpation over the anterior face.  No C/T/L-spine tenderness.  Normal rectal tone.  Nontender to palpation over the anterior neck, chest, all 4 quadrants of the abdomen.  Abdomen soft.  Pelvis stable.  No gross deformities or tenderness to palpation of the bilateral joints, long bones, hands, and feet of the bilateral upper and lower extremities.   Skin:     General: Skin is warm and dry.   Neurological:      Mental Status: He is alert.      Comments: GCS 15.  4 out of 5 strength left upper extremity, 5 out of 5 strength right upper extremity, 5 out of 5 strength right and left lower extremity.  Subjective tingling over the left arm, but intact sensation to light touch in all 4 extremities.   Psychiatric:         Mood and Affect: Mood normal.         Behavior: Behavior normal.         ED Course & MDM    EKG taken at 238 showing normal sinus rate and rhythm, normal axis, normal intervals, left ventricular hypertrophy present, no signs of acute ST elevation or depression    Medical Decision Making  Patient is a 69-year-old male presenting to the emergency department as a limited trauma activation following a syncope and fall event.  Trauma labs sent as well as serial troponin testing.  Initial EKG not  "showing any obvious evidence of dysrhythmia or abnormal intervals.  Did show signs concerning for left ventricular hypertrophy which in the setting of patient's sudden syncope event and history of poor follow-up with outpatient doctors is concerning for a possible cardiac etiology for his syncope and fall.  Patient later did admit that he also used cocaine earlier in the day, which could be an alternative etiology for his syncope.  Not currently having any chest pain.  Sent for CT pan scan imaging to evaluate for acute traumatic injuries.  Maintained in C/T/L-spine precautions.  On my reevaluation and tertiary exam, patient still had slightly decreased strength of his left upper extremity was complaining of \"needles\" on his left upper extremity.  I did call r radiology operations and spoke with the chief resident and called in the MRI techs to perform a stat MRI of the cervical spine to rule out ligamentous injury or central cord syndrome.  Additionally, given his new neurofindings, sent for a MRI of the brain to evaluate for a stroke.  Given patient's traumatic history, presence of more likely etiologies, there are contraindications to tPA especially as stroke is only 1 of many different diagnoses within the differential, and I will not give it at this time.  Potential complications including bleeding risk outweigh the potential benefits.  Patient will be handed off in stable condition pending final trauma recommendations and results of MRI imaging.  Given his syncope event, at a minimum, will likely require admission for further observation and cardiac restratification.    Procedure  Procedures     Isidro Cervantes MD  Resident  05/16/24 0548    "

## 2024-05-17 ENCOUNTER — APPOINTMENT (OUTPATIENT)
Dept: RADIOLOGY | Facility: HOSPITAL | Age: 69
DRG: 029 | End: 2024-05-17
Payer: MEDICARE

## 2024-05-17 ENCOUNTER — HOSPITAL ENCOUNTER (INPATIENT)
Dept: NEUROLOGY | Facility: HOSPITAL | Age: 69
Discharge: HOME | DRG: 029 | End: 2024-05-17
Payer: MEDICARE

## 2024-05-17 ENCOUNTER — ANESTHESIA EVENT (OUTPATIENT)
Dept: OPERATING ROOM | Facility: HOSPITAL | Age: 69
DRG: 029 | End: 2024-05-17
Payer: MEDICARE

## 2024-05-17 ENCOUNTER — ANESTHESIA (OUTPATIENT)
Dept: OPERATING ROOM | Facility: HOSPITAL | Age: 69
DRG: 029 | End: 2024-05-17
Payer: MEDICARE

## 2024-05-17 PROBLEM — I10 PRIMARY HYPERTENSION: Status: ACTIVE | Noted: 2024-05-17

## 2024-05-17 PROBLEM — F14.10 COCAINE ABUSE (MULTI): Status: ACTIVE | Noted: 2024-05-17
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
## 2024-05-17 LAB
ALBUMIN SERPL BCP-MCNC: 3.5 G/DL (ref 3.4–5)
ALBUMIN SERPL BCP-MCNC: 3.6 G/DL (ref 3.4–5)
ANION GAP BLDA CALCULATED.4IONS-SCNC: 8 MMO/L (ref 10–25)
ANION GAP SERPL CALC-SCNC: 11 MMOL/L (ref 10–20)
ANION GAP SERPL CALC-SCNC: 16 MMOL/L (ref 10–20)
APTT PPP: 29 SECONDS (ref 27–38)
BASE EXCESS BLDA CALC-SCNC: -0.1 MMOL/L (ref -2–3)
BODY TEMPERATURE: 37 DEGREES CELSIUS
BUN SERPL-MCNC: 11 MG/DL (ref 6–23)
BUN SERPL-MCNC: 9 MG/DL (ref 6–23)
CA-I BLD-SCNC: 1.08 MMOL/L (ref 1.1–1.33)
CA-I BLDA-SCNC: 1.1 MMOL/L (ref 1.1–1.33)
CALCIUM SERPL-MCNC: 7.6 MG/DL (ref 8.6–10.6)
CALCIUM SERPL-MCNC: 8.3 MG/DL (ref 8.6–10.6)
CHLORIDE BLDA-SCNC: 103 MMOL/L (ref 98–107)
CHLORIDE SERPL-SCNC: 102 MMOL/L (ref 98–107)
CHLORIDE SERPL-SCNC: 105 MMOL/L (ref 98–107)
CO2 SERPL-SCNC: 24 MMOL/L (ref 21–32)
CO2 SERPL-SCNC: 26 MMOL/L (ref 21–32)
CREAT SERPL-MCNC: 0.85 MG/DL (ref 0.5–1.3)
CREAT SERPL-MCNC: 0.89 MG/DL (ref 0.5–1.3)
EGFRCR SERPLBLD CKD-EPI 2021: >90 ML/MIN/1.73M*2
EGFRCR SERPLBLD CKD-EPI 2021: >90 ML/MIN/1.73M*2
ERYTHROCYTE [DISTWIDTH] IN BLOOD BY AUTOMATED COUNT: 13.7 % (ref 11.5–14.5)
ERYTHROCYTE [DISTWIDTH] IN BLOOD BY AUTOMATED COUNT: 14.3 % (ref 11.5–14.5)
GLUCOSE BLD MANUAL STRIP-MCNC: 139 MG/DL (ref 74–99)
GLUCOSE BLDA-MCNC: 145 MG/DL (ref 74–99)
GLUCOSE SERPL-MCNC: 108 MG/DL (ref 74–99)
GLUCOSE SERPL-MCNC: 128 MG/DL (ref 74–99)
HCO3 BLDA-SCNC: 26.4 MMOL/L (ref 22–26)
HCT VFR BLD AUTO: 43.5 % (ref 41–52)
HCT VFR BLD AUTO: 43.5 % (ref 41–52)
HCT VFR BLD EST: 45 % (ref 41–52)
HGB BLD-MCNC: 14.8 G/DL (ref 13.5–17.5)
HGB BLD-MCNC: 15.4 G/DL (ref 13.5–17.5)
HGB BLDA-MCNC: 15 G/DL (ref 13.5–17.5)
INHALED O2 CONCENTRATION: 21 %
INR PPP: 1 (ref 0.9–1.1)
LACTATE BLDA-SCNC: 2.9 MMOL/L (ref 0.4–2)
MAGNESIUM SERPL-MCNC: 1.88 MG/DL (ref 1.6–2.4)
MAGNESIUM SERPL-MCNC: 1.96 MG/DL (ref 1.6–2.4)
MCH RBC QN AUTO: 29 PG (ref 26–34)
MCH RBC QN AUTO: 29.3 PG (ref 26–34)
MCHC RBC AUTO-ENTMCNC: 34 G/DL (ref 32–36)
MCHC RBC AUTO-ENTMCNC: 35.4 G/DL (ref 32–36)
MCV RBC AUTO: 83 FL (ref 80–100)
MCV RBC AUTO: 85 FL (ref 80–100)
NRBC BLD-RTO: 0 /100 WBCS (ref 0–0)
NRBC BLD-RTO: 0 /100 WBCS (ref 0–0)
OXYHGB MFR BLDA: 97.6 % (ref 94–98)
PCO2 BLDA: 49 MM HG (ref 38–42)
PH BLDA: 7.34 PH (ref 7.38–7.42)
PHOSPHATE SERPL-MCNC: 2.3 MG/DL (ref 2.5–4.9)
PHOSPHATE SERPL-MCNC: 3.4 MG/DL (ref 2.5–4.9)
PLATELET # BLD AUTO: 205 X10*3/UL (ref 150–450)
PLATELET # BLD AUTO: 262 X10*3/UL (ref 150–450)
PO2 BLDA: 204 MM HG (ref 85–95)
POTASSIUM BLDA-SCNC: 3.7 MMOL/L (ref 3.5–5.3)
POTASSIUM SERPL-SCNC: 3.9 MMOL/L (ref 3.5–5.3)
POTASSIUM SERPL-SCNC: 3.9 MMOL/L (ref 3.5–5.3)
PROTHROMBIN TIME: 11.4 SECONDS (ref 9.8–12.8)
RBC # BLD AUTO: 5.1 X10*6/UL (ref 4.5–5.9)
RBC # BLD AUTO: 5.26 X10*6/UL (ref 4.5–5.9)
SAO2 % BLDA: 100 % (ref 94–100)
SODIUM BLDA-SCNC: 134 MMOL/L (ref 136–145)
SODIUM SERPL-SCNC: 138 MMOL/L (ref 136–145)
SODIUM SERPL-SCNC: 138 MMOL/L (ref 136–145)
WBC # BLD AUTO: 5.8 X10*3/UL (ref 4.4–11.3)
WBC # BLD AUTO: 9.6 X10*3/UL (ref 4.4–11.3)

## 2024-05-17 PROCEDURE — 3700000002 HC GENERAL ANESTHESIA TIME - EACH INCREMENTAL 1 MINUTE: Performed by: ORTHOPAEDIC SURGERY

## 2024-05-17 PROCEDURE — 36620 INSERTION CATHETER ARTERY: CPT

## 2024-05-17 PROCEDURE — 2720000007 HC OR 272 NO HCPCS: Performed by: ORTHOPAEDIC SURGERY

## 2024-05-17 PROCEDURE — 63051 C-LAMINOPLASTY W/GRAFT/PLATE: CPT | Performed by: ORTHOPAEDIC SURGERY

## 2024-05-17 PROCEDURE — 72020 X-RAY EXAM OF SPINE 1 VIEW: CPT

## 2024-05-17 PROCEDURE — 2500000005 HC RX 250 GENERAL PHARMACY W/O HCPCS

## 2024-05-17 PROCEDURE — 99140 ANES COMP EMERGENCY COND: CPT | Performed by: ANESTHESIOLOGY

## 2024-05-17 PROCEDURE — 00NW0ZZ RELEASE CERVICAL SPINAL CORD, OPEN APPROACH: ICD-10-PCS | Performed by: ORTHOPAEDIC SURGERY

## 2024-05-17 PROCEDURE — A63051 PR C-LAMINOPLASTY W/GRAFT/PLATE, 2 OR MORE: Performed by: ANESTHESIOLOGY

## 2024-05-17 PROCEDURE — 2500000004 HC RX 250 GENERAL PHARMACY W/ HCPCS (ALT 636 FOR OP/ED): Performed by: NURSE PRACTITIONER

## 2024-05-17 PROCEDURE — 85027 COMPLETE CBC AUTOMATED: CPT | Performed by: NURSE PRACTITIONER

## 2024-05-17 PROCEDURE — C1713 ANCHOR/SCREW BN/BN,TIS/BN: HCPCS | Performed by: ORTHOPAEDIC SURGERY

## 2024-05-17 PROCEDURE — 63045 LAM FACETEC & FORAMOT CRV: CPT | Performed by: ORTHOPAEDIC SURGERY

## 2024-05-17 PROCEDURE — 2500000004 HC RX 250 GENERAL PHARMACY W/ HCPCS (ALT 636 FOR OP/ED)

## 2024-05-17 PROCEDURE — 2500000004 HC RX 250 GENERAL PHARMACY W/ HCPCS (ALT 636 FOR OP/ED): Performed by: STUDENT IN AN ORGANIZED HEALTH CARE EDUCATION/TRAINING PROGRAM

## 2024-05-17 PROCEDURE — 2780000003 HC OR 278 NO HCPCS: Performed by: ORTHOPAEDIC SURGERY

## 2024-05-17 PROCEDURE — 2500000001 HC RX 250 WO HCPCS SELF ADMINISTERED DRUGS (ALT 637 FOR MEDICARE OP): Performed by: NURSE PRACTITIONER

## 2024-05-17 PROCEDURE — 2500000001 HC RX 250 WO HCPCS SELF ADMINISTERED DRUGS (ALT 637 FOR MEDICARE OP): Performed by: ORTHOPAEDIC SURGERY

## 2024-05-17 PROCEDURE — 2500000004 HC RX 250 GENERAL PHARMACY W/ HCPCS (ALT 636 FOR OP/ED): Performed by: ORTHOPAEDIC SURGERY

## 2024-05-17 PROCEDURE — 80069 RENAL FUNCTION PANEL: CPT | Performed by: NURSE PRACTITIONER

## 2024-05-17 PROCEDURE — 1200000002 HC GENERAL ROOM WITH TELEMETRY DAILY

## 2024-05-17 PROCEDURE — 3600000018 HC OR TIME - INITIAL BASE CHARGE - PROCEDURE LEVEL SIX: Performed by: ORTHOPAEDIC SURGERY

## 2024-05-17 PROCEDURE — 83735 ASSAY OF MAGNESIUM: CPT | Performed by: NURSE PRACTITIONER

## 2024-05-17 PROCEDURE — 85610 PROTHROMBIN TIME: CPT | Performed by: NURSE PRACTITIONER

## 2024-05-17 PROCEDURE — 95938 SOMATOSENSORY TESTING: CPT

## 2024-05-17 PROCEDURE — 2500000004 HC RX 250 GENERAL PHARMACY W/ HCPCS (ALT 636 FOR OP/ED): Mod: SE

## 2024-05-17 PROCEDURE — 00NY0ZZ RELEASE LUMBAR SPINAL CORD, OPEN APPROACH: ICD-10-PCS | Performed by: ORTHOPAEDIC SURGERY

## 2024-05-17 PROCEDURE — 82330 ASSAY OF CALCIUM: CPT | Performed by: NURSE PRACTITIONER

## 2024-05-17 PROCEDURE — 2500000005 HC RX 250 GENERAL PHARMACY W/O HCPCS: Performed by: ORTHOPAEDIC SURGERY

## 2024-05-17 PROCEDURE — 0PH304Z INSERTION OF INTERNAL FIXATION DEVICE INTO CERVICAL VERTEBRA, OPEN APPROACH: ICD-10-PCS | Performed by: ORTHOPAEDIC SURGERY

## 2024-05-17 PROCEDURE — 3600000017 HC OR TIME - EACH INCREMENTAL 1 MINUTE - PROCEDURE LEVEL SIX: Performed by: ORTHOPAEDIC SURGERY

## 2024-05-17 PROCEDURE — 84132 ASSAY OF SERUM POTASSIUM: CPT | Mod: 91 | Performed by: NURSE PRACTITIONER

## 2024-05-17 PROCEDURE — 76937 US GUIDE VASCULAR ACCESS: CPT

## 2024-05-17 PROCEDURE — A4217 STERILE WATER/SALINE, 500 ML: HCPCS | Performed by: ORTHOPAEDIC SURGERY

## 2024-05-17 PROCEDURE — 3700000001 HC GENERAL ANESTHESIA TIME - INITIAL BASE CHARGE: Performed by: ORTHOPAEDIC SURGERY

## 2024-05-17 PROCEDURE — 2500000004 HC RX 250 GENERAL PHARMACY W/ HCPCS (ALT 636 FOR OP/ED): Mod: SE | Performed by: NURSE PRACTITIONER

## 2024-05-17 PROCEDURE — 37799 UNLISTED PX VASCULAR SURGERY: CPT | Performed by: NURSE PRACTITIONER

## 2024-05-17 PROCEDURE — 36415 COLL VENOUS BLD VENIPUNCTURE: CPT | Performed by: NURSE PRACTITIONER

## 2024-05-17 DEVICE — SCREW, TIMESH 2.6 X 7MM: Type: IMPLANTABLE DEVICE | Site: SPINE CERVICAL | Status: FUNCTIONAL

## 2024-05-17 DEVICE — IMPLANTABLE DEVICE: Type: IMPLANTABLE DEVICE | Site: SPINE CERVICAL | Status: FUNCTIONAL

## 2024-05-17 DEVICE — PLATE, OD, 8MM: Type: IMPLANTABLE DEVICE | Site: SPINE CERVICAL | Status: FUNCTIONAL

## 2024-05-17 RX ORDER — FENTANYL CITRATE 50 UG/ML
INJECTION, SOLUTION INTRAMUSCULAR; INTRAVENOUS AS NEEDED
Status: DISCONTINUED | OUTPATIENT
Start: 2024-05-17 | End: 2024-05-17

## 2024-05-17 RX ORDER — ROCURONIUM BROMIDE 10 MG/ML
INJECTION, SOLUTION INTRAVENOUS AS NEEDED
Status: DISCONTINUED | OUTPATIENT
Start: 2024-05-17 | End: 2024-05-17

## 2024-05-17 RX ORDER — SODIUM CHLORIDE 0.9 G/100ML
IRRIGANT IRRIGATION AS NEEDED
Status: DISCONTINUED | OUTPATIENT
Start: 2024-05-17 | End: 2024-05-17 | Stop reason: HOSPADM

## 2024-05-17 RX ORDER — LIDOCAINE HYDROCHLORIDE 20 MG/ML
INJECTION, SOLUTION INFILTRATION; PERINEURAL AS NEEDED
Status: DISCONTINUED | OUTPATIENT
Start: 2024-05-17 | End: 2024-05-17

## 2024-05-17 RX ORDER — MAGNESIUM SULFATE HEPTAHYDRATE 40 MG/ML
2 INJECTION, SOLUTION INTRAVENOUS ONCE
Status: COMPLETED | OUTPATIENT
Start: 2024-05-17 | End: 2024-05-18

## 2024-05-17 RX ORDER — REMIFENTANIL HYDROCHLORIDE 1 MG/ML
INJECTION, POWDER, LYOPHILIZED, FOR SOLUTION INTRAVENOUS AS NEEDED
Status: DISCONTINUED | OUTPATIENT
Start: 2024-05-17 | End: 2024-05-17

## 2024-05-17 RX ORDER — MAGNESIUM SULFATE HEPTAHYDRATE 40 MG/ML
2 INJECTION, SOLUTION INTRAVENOUS ONCE
Status: COMPLETED | OUTPATIENT
Start: 2024-05-17 | End: 2024-05-17

## 2024-05-17 RX ORDER — HYDRALAZINE HYDROCHLORIDE 20 MG/ML
INJECTION INTRAMUSCULAR; INTRAVENOUS
Status: COMPLETED
Start: 2024-05-17 | End: 2024-05-17

## 2024-05-17 RX ORDER — BACITRACIN 500 [USP'U]/G
OINTMENT TOPICAL AS NEEDED
Status: DISCONTINUED | OUTPATIENT
Start: 2024-05-17 | End: 2024-05-17 | Stop reason: HOSPADM

## 2024-05-17 RX ORDER — HYDROMORPHONE HYDROCHLORIDE 1 MG/ML
INJECTION, SOLUTION INTRAMUSCULAR; INTRAVENOUS; SUBCUTANEOUS AS NEEDED
Status: DISCONTINUED | OUTPATIENT
Start: 2024-05-17 | End: 2024-05-17

## 2024-05-17 RX ORDER — HYDRALAZINE HYDROCHLORIDE 20 MG/ML
5 INJECTION INTRAMUSCULAR; INTRAVENOUS ONCE
Status: COMPLETED | OUTPATIENT
Start: 2024-05-17 | End: 2024-05-17

## 2024-05-17 RX ORDER — PROPOFOL 10 MG/ML
INJECTION, EMULSION INTRAVENOUS AS NEEDED
Status: DISCONTINUED | OUTPATIENT
Start: 2024-05-17 | End: 2024-05-17

## 2024-05-17 RX ORDER — POTASSIUM CHLORIDE 14.9 MG/ML
20 INJECTION INTRAVENOUS ONCE
Status: COMPLETED | OUTPATIENT
Start: 2024-05-17 | End: 2024-05-18

## 2024-05-17 RX ORDER — ONDANSETRON HYDROCHLORIDE 2 MG/ML
INJECTION, SOLUTION INTRAVENOUS AS NEEDED
Status: DISCONTINUED | OUTPATIENT
Start: 2024-05-17 | End: 2024-05-17

## 2024-05-17 RX ORDER — CEFAZOLIN 1 G/1
INJECTION, POWDER, FOR SOLUTION INTRAVENOUS AS NEEDED
Status: DISCONTINUED | OUTPATIENT
Start: 2024-05-17 | End: 2024-05-17

## 2024-05-17 RX ORDER — PROPOFOL 10 MG/ML
INJECTION, EMULSION INTRAVENOUS CONTINUOUS PRN
Status: DISCONTINUED | OUTPATIENT
Start: 2024-05-17 | End: 2024-05-17

## 2024-05-17 RX ORDER — PHENYLEPHRINE HCL IN 0.9% NACL 0.4MG/10ML
SYRINGE (ML) INTRAVENOUS AS NEEDED
Status: DISCONTINUED | OUTPATIENT
Start: 2024-05-17 | End: 2024-05-17

## 2024-05-17 RX ORDER — HYDROMORPHONE HYDROCHLORIDE 1 MG/ML
0.2 INJECTION, SOLUTION INTRAMUSCULAR; INTRAVENOUS; SUBCUTANEOUS
Status: DISCONTINUED | OUTPATIENT
Start: 2024-05-17 | End: 2024-05-19

## 2024-05-17 RX ORDER — HYDRALAZINE HYDROCHLORIDE 20 MG/ML
10 INJECTION INTRAMUSCULAR; INTRAVENOUS ONCE
Status: COMPLETED | OUTPATIENT
Start: 2024-05-17 | End: 2024-05-17

## 2024-05-17 RX ORDER — NITROGLYCERIN 40 MG/100ML
INJECTION INTRAVENOUS AS NEEDED
Status: DISCONTINUED | OUTPATIENT
Start: 2024-05-17 | End: 2024-05-17

## 2024-05-17 RX ORDER — CEFAZOLIN SODIUM 2 G/100ML
2 INJECTION, SOLUTION INTRAVENOUS EVERY 8 HOURS
Qty: 600 ML | Refills: 0 | Status: COMPLETED | OUTPATIENT
Start: 2024-05-18 | End: 2024-05-19

## 2024-05-17 RX ORDER — SODIUM CHLORIDE 9 MG/ML
50 INJECTION, SOLUTION INTRAVENOUS CONTINUOUS
Status: DISCONTINUED | OUTPATIENT
Start: 2024-05-17 | End: 2024-05-19

## 2024-05-17 RX ORDER — POLYMYXIN B 500000 [USP'U]/1
INJECTION, POWDER, LYOPHILIZED, FOR SOLUTION INTRAMUSCULAR; INTRATHECAL; INTRAVENOUS; OPHTHALMIC AS NEEDED
Status: DISCONTINUED | OUTPATIENT
Start: 2024-05-17 | End: 2024-05-17 | Stop reason: HOSPADM

## 2024-05-17 RX ORDER — HYDRALAZINE HYDROCHLORIDE 20 MG/ML
10 INJECTION INTRAMUSCULAR; INTRAVENOUS EVERY 6 HOURS PRN
Status: DISCONTINUED | OUTPATIENT
Start: 2024-05-17 | End: 2024-05-19

## 2024-05-17 RX ADMIN — PROPOFOL 30 MG: 10 INJECTION, EMULSION INTRAVENOUS at 17:51

## 2024-05-17 RX ADMIN — REMIFENTANIL HYDROCHLORIDE 20 MCG: 1 INJECTION, POWDER, LYOPHILIZED, FOR SOLUTION INTRAVENOUS at 18:02

## 2024-05-17 RX ADMIN — NITROGLYCERIN 100 MCG: 10 INJECTION INTRAVENOUS at 19:40

## 2024-05-17 RX ADMIN — HYDRALAZINE HYDROCHLORIDE 5 MG: 20 INJECTION INTRAMUSCULAR; INTRAVENOUS at 08:02

## 2024-05-17 RX ADMIN — HYDROMORPHONE HYDROCHLORIDE 0.5 MG: 1 INJECTION, SOLUTION INTRAMUSCULAR; INTRAVENOUS; SUBCUTANEOUS at 18:54

## 2024-05-17 RX ADMIN — FENTANYL CITRATE 50 MCG: 50 INJECTION, SOLUTION INTRAMUSCULAR; INTRAVENOUS at 18:00

## 2024-05-17 RX ADMIN — ROCURONIUM 20 MG: 50 INJECTION, SOLUTION INTRAVENOUS at 19:00

## 2024-05-17 RX ADMIN — ROCURONIUM 20 MG: 50 INJECTION, SOLUTION INTRAVENOUS at 17:43

## 2024-05-17 RX ADMIN — LIDOCAINE HYDROCHLORIDE 6 ML: 20 INJECTION, SOLUTION INFILTRATION; PERINEURAL at 16:54

## 2024-05-17 RX ADMIN — HYDRALAZINE HYDROCHLORIDE 10 MG: 20 INJECTION INTRAMUSCULAR; INTRAVENOUS at 20:30

## 2024-05-17 RX ADMIN — DOCUSATE SODIUM 100 MG: 100 CAPSULE, LIQUID FILLED ORAL at 21:54

## 2024-05-17 RX ADMIN — MAGNESIUM SULFATE HEPTAHYDRATE 2 G: 40 INJECTION, SOLUTION INTRAVENOUS at 02:23

## 2024-05-17 RX ADMIN — SODIUM CHLORIDE 75 ML/HR: 9 INJECTION, SOLUTION INTRAVENOUS at 20:00

## 2024-05-17 RX ADMIN — Medication 80 MCG: at 18:25

## 2024-05-17 RX ADMIN — REMIFENTANIL HYDROCHLORIDE 20 MCG: 1 INJECTION, POWDER, LYOPHILIZED, FOR SOLUTION INTRAVENOUS at 17:49

## 2024-05-17 RX ADMIN — Medication 80 MCG: at 17:35

## 2024-05-17 RX ADMIN — NITROGLYCERIN 100 MCG: 10 INJECTION INTRAVENOUS at 19:45

## 2024-05-17 RX ADMIN — REMIFENTANIL HYDROCHLORIDE 20 MCG: 1 INJECTION, POWDER, LYOPHILIZED, FOR SOLUTION INTRAVENOUS at 17:28

## 2024-05-17 RX ADMIN — SENNOSIDES 8.6 MG: 8.6 TABLET, FILM COATED ORAL at 21:54

## 2024-05-17 RX ADMIN — SODIUM CHLORIDE, SODIUM LACTATE, POTASSIUM CHLORIDE, AND CALCIUM CHLORIDE: 600; 310; 30; 20 INJECTION, SOLUTION INTRAVENOUS at 16:46

## 2024-05-17 RX ADMIN — ONDANSETRON 4 MG: 2 INJECTION INTRAMUSCULAR; INTRAVENOUS at 19:10

## 2024-05-17 RX ADMIN — MAGNESIUM SULFATE HEPTAHYDRATE 2 G: 40 INJECTION, SOLUTION INTRAVENOUS at 23:07

## 2024-05-17 RX ADMIN — REMIFENTANIL HYDROCHLORIDE 0.05 MCG/KG/MIN: 1 INJECTION, POWDER, LYOPHILIZED, FOR SOLUTION INTRAVENOUS at 17:51

## 2024-05-17 RX ADMIN — PROPOFOL 70 MCG/KG/MIN: 10 INJECTION, EMULSION INTRAVENOUS at 17:23

## 2024-05-17 RX ADMIN — NITROGLYCERIN 100 MCG: 10 INJECTION INTRAVENOUS at 19:31

## 2024-05-17 RX ADMIN — PROPOFOL 50 MG: 10 INJECTION, EMULSION INTRAVENOUS at 17:11

## 2024-05-17 RX ADMIN — ROCURONIUM 20 MG: 50 INJECTION, SOLUTION INTRAVENOUS at 19:03

## 2024-05-17 RX ADMIN — FENTANYL CITRATE 50 MCG: 50 INJECTION, SOLUTION INTRAMUSCULAR; INTRAVENOUS at 17:50

## 2024-05-17 RX ADMIN — REMIFENTANIL HYDROCHLORIDE 20 MCG: 1 INJECTION, POWDER, LYOPHILIZED, FOR SOLUTION INTRAVENOUS at 17:45

## 2024-05-17 RX ADMIN — CEFAZOLIN 2 G: 1 INJECTION, POWDER, FOR SOLUTION INTRAMUSCULAR; INTRAVENOUS at 17:02

## 2024-05-17 RX ADMIN — ROCURONIUM 50 MG: 50 INJECTION, SOLUTION INTRAVENOUS at 16:54

## 2024-05-17 RX ADMIN — SODIUM CHLORIDE 75 ML/HR: 9 INJECTION, SOLUTION INTRAVENOUS at 09:24

## 2024-05-17 RX ADMIN — POTASSIUM CHLORIDE 20 MEQ: 200 INJECTION, SOLUTION INTRAVENOUS at 23:07

## 2024-05-17 RX ADMIN — PROPOFOL 150 MG: 10 INJECTION, EMULSION INTRAVENOUS at 16:54

## 2024-05-17 RX ADMIN — SUGAMMADEX 200 MG: 100 INJECTION, SOLUTION INTRAVENOUS at 19:24

## 2024-05-17 RX ADMIN — NITROGLYCERIN 100 MCG: 10 INJECTION INTRAVENOUS at 19:38

## 2024-05-17 RX ADMIN — REMIFENTANIL HYDROCHLORIDE 20 MCG: 1 INJECTION, POWDER, LYOPHILIZED, FOR SOLUTION INTRAVENOUS at 17:50

## 2024-05-17 RX ADMIN — SODIUM PHOSPHATE, MONOBASIC, MONOHYDRATE AND SODIUM PHOSPHATE, DIBASIC, ANHYDROUS 15 MMOL: 142; 276 INJECTION, SOLUTION INTRAVENOUS at 10:45

## 2024-05-17 ASSESSMENT — PAIN - FUNCTIONAL ASSESSMENT
PAIN_FUNCTIONAL_ASSESSMENT: 0-10

## 2024-05-17 ASSESSMENT — PAIN SCALES - GENERAL
PAINLEVEL_OUTOF10: 0 - NO PAIN
PAIN_LEVEL: 1
PAINLEVEL_OUTOF10: 0 - NO PAIN

## 2024-05-17 ASSESSMENT — COGNITIVE AND FUNCTIONAL STATUS - GENERAL
PERSONAL GROOMING: TOTAL
HELP NEEDED FOR BATHING: TOTAL
DRESSING REGULAR LOWER BODY CLOTHING: TOTAL
MOVING FROM LYING ON BACK TO SITTING ON SIDE OF FLAT BED WITH BEDRAILS: TOTAL
MOVING TO AND FROM BED TO CHAIR: TOTAL
TOILETING: TOTAL
STANDING UP FROM CHAIR USING ARMS: TOTAL
CLIMB 3 TO 5 STEPS WITH RAILING: TOTAL
DAILY ACTIVITIY SCORE: 6
DRESSING REGULAR UPPER BODY CLOTHING: TOTAL
MOBILITY SCORE: 6
TURNING FROM BACK TO SIDE WHILE IN FLAT BAD: TOTAL
WALKING IN HOSPITAL ROOM: TOTAL
EATING MEALS: TOTAL

## 2024-05-17 NOTE — ANESTHESIA PROCEDURE NOTES
Arterial Line:    Date/Time: 5/17/2024 5:02 PM    Staffing  Performed: resident   Authorized by: Lauri Hogan MD    Performed by: Lama Radha MD    An arterial line was placed. Procedure performed using ultrasound guidance.in the OR for the following indication(s): continuous blood pressure monitoring and blood sampling needed.    A 20 gauge (size), 1 and 3/4 inch (length), Angiocath (type) catheter was placed into the Right radial artery, secured by Tegaderm,   Seldinger technique not used.  Events:  patient tolerated procedure well with no complications.

## 2024-05-17 NOTE — NURSING NOTE
0700: Bedside RN report received    0800, 1200, 1600: Hand hygiene performed    1633: OR team at bedside, taking pt to OR    1918: Bedside RN report given. Night nurse aware that I have no received OR report yet.

## 2024-05-17 NOTE — PROGRESS NOTES
Jimmy Carpenter is a 69 y.o. male on day 0 of admission presenting with Cervical cord compression with myelopathy (Multi).    69 year old male with central cord syndrome with plans for decompression and fusion with spin   OK to proceed with operative intervention  Return to TSICU, will continue to monitor and manage  NPO, IVF    SW placed call to patients emergency contact Lauren to complete assessment. Left message requesting call back. ALICIA will continue to follow for assistance with discharge planning.     Alicia spoke with Lauren - provided update on patient OR status and current hospital course. ALICIA unable to complete assessment with Lauren. She is upset/raising voice that no one called her, though provider left message regarding OR and SW left message for her within the last 24 hours. Stated she will be here this evening or tomorrow morning to visit.

## 2024-05-17 NOTE — PROGRESS NOTES
"Orthopaedic Surgery Progress Note    Subjective:  Pt awake and alert in TSICU. Endorses L>R arm pain, as well as N/T and weakness of the bilateral upper extremities. Denies fevers, chills, nausea, vomiting, chest pain, shortness of breath.    Objective:    Vitals:  BP (!) 183/99   Pulse 75   Temp 36.9 °C (98.4 °F)   Resp 11   Ht 1.779 m (5' 10.04\")   Wt 58.8 kg (129 lb 10.1 oz)   SpO2 100%   BMI 18.58 kg/m²     Physical Exam:  - Constitutional: No acute distress, cooperative  - Eyes: EOM grossly intact  - Head/Neck: Trachea midline  - Respiratory/Thorax: Normal work of breathing  - Cardiovascular: RRR on peripheral palpation  - Gastrointestinal: Nondistended  - Psychological: Appropriate mood/behavior  - Skin: Warm and dry. Additional findings in musculoskeletal evaluation    - Musculoskeletal:  - Aspen collar in place    Spine Exam:  C6: SILT   Wrist Ext: 3/5 Left; 4/5 Right  C7: SILT   Triceps: 4/5 Left; 4/5 Right  C8: SILT   Finger flexion: 4/5 Left; 4/5 Right  T1: SILT    Interossei: 2/5 Left; 2/5 Right     Bicep Reflex 2+   Bilaterally  Morales: Positive    Lab Results:  Results from last 7 days   Lab Units 05/17/24  0014 05/16/24  0152   WBC AUTO x10*3/uL 5.8 6.4   HEMOGLOBIN g/dL 15.4 13.4*   HEMATOCRIT % 43.5 38.6*   PLATELETS AUTO x10*3/uL 205 80*         Results from last 7 days   Lab Units 05/17/24  0014 05/16/24  0152   SODIUM mmol/L 138 138   POTASSIUM mmol/L 3.9 3.6   CHLORIDE mmol/L 105 102   CO2 mmol/L 26 26   BUN mg/dL 11 12   CREATININE mg/dL 0.85 0.97   GLUCOSE mg/dL 108* 82   CALCIUM mg/dL 7.6* 8.2*         Medications:  Scheduled medications  docusate sodium, 100 mg, oral, BID  sennosides, 1 tablet, oral, BID      Continuous medications  lactated Ringer's, 100 mL/hr, Last Rate: 100 mL/hr (05/17/24 0000)      PRN medications  PRN medications: acetaminophen, polyethylene glycol    Assessment:  69M with central cord syndrome. Plan for cervical decompression and fusion today with Dr." Harika.    Plan:   - NPO pending surgery  - Appreciate documentation of clearance by primary team  - Pre-operative labs/studies COMPLETE  - Consented and posted to OR schedule for posterior cervical decompression and fusion w/ orthopedic surgery on 5/17  - Strict Bedrest, MAINTAIN C-collar, strict c-spine precautions  - Pre-operative ABx: None indicated  - No indication for transfusion pre-operatively, 2U PRBC on hold for OR   - DVT PPx: SCDs, please hold DVT ppx    Maverick Zhu MD [facility]  PGY-4 Orthopedic Surgery  Available by Epic Message    This patient will be followed by the Ortho Spine Team. Epic Chat preferred.    First Call: Meir Gramajo, PGY-2  Second Call: Maverick Zhu, PGY-4

## 2024-05-17 NOTE — PROGRESS NOTES
"Orthopaedic Surgery Progress Note    Subjective:  Pt evaluated post operatively in TSICU. Pain well controlled. Extubated. Resting comfortably.    Objective:    Vitals:  BP (!) 176/97   Pulse 70   Temp 37.3 °C (99.1 °F) (Temporal)   Resp 15   Ht 1.779 m (5' 10.04\")   Wt 58.8 kg (129 lb 10.1 oz)   SpO2 100%   BMI 18.58 kg/m²     Physical Exam:  - Constitutional: No acute distress  - Eyes: EOM grossly intact  - Head/Neck: Trachea midline  - Respiratory/Thorax: Normal work of breathing  - Cardiovascular: RRR on peripheral palpation  - Gastrointestinal: Nondistended  - Psychological: Appropriate mood/behavior  - Skin: Warm and dry.     - Musculoskeletal:  - soft collar    Spine Exam:  - moving all four extremities spontaneously    Lab Results:  Results from last 7 days   Lab Units 05/17/24  0014 05/16/24  0152   WBC AUTO x10*3/uL 5.8 6.4   HEMOGLOBIN g/dL 15.4 13.4*   HEMATOCRIT % 43.5 38.6*   PLATELETS AUTO x10*3/uL 205 80*         Results from last 7 days   Lab Units 05/17/24  0014 05/16/24  0152   SODIUM mmol/L 138 138   POTASSIUM mmol/L 3.9 3.6   CHLORIDE mmol/L 105 102   CO2 mmol/L 26 26   BUN mg/dL 11 12   CREATININE mg/dL 0.85 0.97   GLUCOSE mg/dL 108* 82   CALCIUM mg/dL 7.6* 8.2*         Medications:  Scheduled medications  [Transfer Hold] docusate sodium, 100 mg, oral, BID  [Transfer Hold] sennosides, 1 tablet, oral, BID      Continuous medications  sodium chloride 0.9%, 75 mL/hr, Last Rate: 75 mL/hr (05/17/24 0951)      PRN medications  PRN medications: [Transfer Hold] acetaminophen, [Transfer Hold] HYDROmorphone, [Transfer Hold] polyethylene glycol    Assessment:  69M with central cord syndrome. Plan for cervical decompression and fusion today with Dr. Shabazz.    Plan:   - Soft collar, no head of bed restrictions, OOB as tolerated  - Perioperative Ancef x48 hours  - DVX ppx: SCDs, ambulation, please hold chemo DVT ppx in setting of spine surgery  - maintain HV drain x1, please monitor and record output " q8hrs  - Analgesia per primary, standard ortho post operative pain regimen includes Tylenol 650mg, oxy 5mg/10mg PRN, Dilaudid 0.4mg PRN  - no need for steroids  - OT/PT  - Ortho Spine will continue to follow    Maverick Zhu MD MARVIN  Orthopaedic Surgery, PGY-4  p. [phone]  Epic Carolyn Preferred      MD JUAN ANTONIO OrozcoA  PGY-4 Orthopedic Surgery  Available by Epic Message    This patient will be followed by the Ortho Spine Team. Epic Chat preferred.    First Call: Meir Gramajo, PGY-2  Second Call: Maverick Zhu, PGY-4

## 2024-05-17 NOTE — CONSULTS
Orthopaedic Surgery Consult Note    Subjective:    Injury: c/f central cord syndrome  HPI: 69M (denies PMHx, does not see a doctor, UTox positive for cannabis and cocaine) p/a mGLF w headstrike last night w new BUE weakness. Decreased hand dexterity. Denies bowel/bladder sx or saddle anesthesia. Exam: RUE: 4/5 wrist ext, 2/5 finger abd; LUE: 4/5 elbow flexion, 4/5 Wrist Ext, 2/5 finger abd. 5/5 motor BLE. SILT throughout BUE/BLE. +Hoffmans RUE. CT C/T/L spine wo acute fx or traumatic malalignment. MRI C-spine w severe degenerative changes and severe cord compression at C3/4 and C4/5.    PMH: per above/EMR  PSH: per above/EMR  SocHx:      - 1-2 cigarrettes per week      - Denies EtOH use      - Endorses cocaine use (last use in PM on 5/15)  FamHx:  Non-contributory to this patient's acute orthopaedic problem other than as mentioned in HPI  All: Reviewed in EMR  Meds: Reviewed in EMR    Objective:  · Physical Exam:  - Constitutional: No acute distress, cooperative  - Eyes: EOM grossly intact  - Head/Neck: Trachea midline  - Respiratory/Thorax: Normal work of breathing  - Cardiovascular: RRR on peripheral palpation  - Gastrointestinal: Nondistended  - Psychological: Appropriate mood/behavior  - Skin: Warm and dry. Additional findings in musculoskeletal evaluation  - Musculoskeletal:  Spine Exam:  C5: SILT   Deltoid 5/5 Left; 5/5 Right  C6: SILT   Wrist Ext: 4/5 Left; 4/5 Right  C7: SILT   Triceps: 5/5 Left; 5/5 Right  C8: SILT   Finger flexion: 2/5 Left; 2/5 Right  T1: SILT    Interossei: 2/5 Left; 2/5 Right    Bicep Reflex 2+   Bilaterally  Morales: Positive on LUE    L1: SILT       L2: SILT      Hip flexors 5/5 Left; 5/5 Right  L3: SILT      Knee extension 5/5 Left; 5/5 Right  L4: SILT      Tib Ant. (Dorsiflexion) 5/5 Left; 5/5 Right  L5: SILT      EHL 5/5 Left; 5/5 Right  S1: SILT      Plantarflexion 5/5 Left; 5/5 Right    Patellar reflex: 2+   Bilaterally    Babinkski: Intact  No clonus    Rectal exam deferred due  to patient request due to lack of bowel/bladder incontinence or saddle anesthesia.      ROS      - 14 point ROS negative except as above    Results for orders placed or performed during the hospital encounter of 05/16/24 (from the past 24 hour(s))   Blood Gas Venous Full Panel Unsolicited   Result Value Ref Range    POCT pH, Venous 7.39 7.33 - 7.43 pH    POCT pCO2, Venous 49 41 - 51 mm Hg    POCT pO2, Venous 32 (L) 35 - 45 mm Hg    POCT SO2, Venous 54 45 - 75 %    POCT Oxy Hemoglobin, Venous 49.3 45.0 - 75.0 %    POCT Hematocrit Calculated, Venous 41.0 41.0 - 52.0 %    POCT Sodium, Venous 134 (L) 136 - 145 mmol/L    POCT Potassium, Venous 4.4 3.5 - 5.3 mmol/L    POCT Chloride, Venous 104 98 - 107 mmol/L    POCT Ionized Calicum, Venous 1.10 1.10 - 1.33 mmol/L    POCT Glucose, Venous 87 74 - 99 mg/dL    POCT Lactate, Venous 1.0 0.4 - 2.0 mmol/L    POCT Base Excess, Venous 3.7 (H) -2.0 - 3.0 mmol/L    POCT HCO3 Calculated, Venous 29.7 (H) 22.0 - 26.0 mmol/L    POCT Hemoglobin, Venous 13.6 13.5 - 17.5 g/dL    POCT Anion Gap, Venous 5.0 (L) 10.0 - 25.0 mmol/L    Patient Temperature 37.0 degrees Celsius   CBC and Auto Differential   Result Value Ref Range    WBC 6.4 4.4 - 11.3 x10*3/uL    nRBC 0.0 0.0 - 0.0 /100 WBCs    RBC 4.48 (L) 4.50 - 5.90 x10*6/uL    Hemoglobin 13.4 (L) 13.5 - 17.5 g/dL    Hematocrit 38.6 (L) 41.0 - 52.0 %    MCV 86 80 - 100 fL    MCH 29.9 26.0 - 34.0 pg    MCHC 34.7 32.0 - 36.0 g/dL    RDW 14.4 11.5 - 14.5 %    Platelets 80 (L) 150 - 450 x10*3/uL    Immature Granulocytes %, Automated 0.5 0.0 - 0.9 %    Immature Granulocytes Absolute, Automated 0.03 0.00 - 0.70 x10*3/uL   Comprehensive Metabolic Panel   Result Value Ref Range    Glucose 82 74 - 99 mg/dL    Sodium 138 136 - 145 mmol/L    Potassium 3.6 3.5 - 5.3 mmol/L    Chloride 102 98 - 107 mmol/L    Bicarbonate 26 21 - 32 mmol/L    Anion Gap 14 10 - 20 mmol/L    Urea Nitrogen 12 6 - 23 mg/dL    Creatinine 0.97 0.50 - 1.30 mg/dL    eGFR 85 >60  mL/min/1.73m*2    Calcium 8.2 (L) 8.6 - 10.6 mg/dL    Albumin 3.8 3.4 - 5.0 g/dL    Alkaline Phosphatase 83 33 - 136 U/L    Total Protein 6.9 6.4 - 8.2 g/dL    AST 16 9 - 39 U/L    Bilirubin, Total 0.8 0.0 - 1.2 mg/dL    ALT 11 10 - 52 U/L   Alcohol   Result Value Ref Range    Alcohol <10 <=10 mg/dL   Protime-INR   Result Value Ref Range    Protime 10.6 9.8 - 12.8 seconds    INR 0.9 0.9 - 1.1   Type And Screen   Result Value Ref Range    ABO TYPE B     Rh TYPE POS     ANTIBODY SCREEN NEG    Troponin, High Sensitivity, 1 Hour   Result Value Ref Range    Troponin I, High Sensitivity 6 0 - 53 ng/L   Manual Differential   Result Value Ref Range    Neutrophils %, Manual 44.7 40.0 - 80.0 %    Lymphocytes %, Manual 39.5 13.0 - 44.0 %    Monocytes %, Manual 14.0 2.0 - 10.0 %    Eosinophils %, Manual 0.9 0.0 - 6.0 %    Basophils %, Manual 0.9 0.0 - 2.0 %    Seg Neutrophils Absolute, Manual 2.86 1.20 - 7.00 x10*3/uL    Lymphocytes Absolute, Manual 2.53 1.20 - 4.80 x10*3/uL    Monocytes Absolute, Manual 0.90 0.10 - 1.00 x10*3/uL    Eosinophils Absolute, Manual 0.06 0.00 - 0.70 x10*3/uL    Basophils Absolute, Manual 0.06 0.00 - 0.10 x10*3/uL    Total Cells Counted 114     RBC Morphology No significant RBC morphology present    ECG 12 lead   Result Value Ref Range    Ventricular Rate 84 BPM    Atrial Rate 84 BPM    TN Interval 170 ms    QRS Duration 70 ms    QT Interval 390 ms    QTC Calculation(Bazett) 460 ms    P Axis 81 degrees    R Axis -18 degrees    T Axis 69 degrees    QRS Count 13 beats    Q Onset 226 ms    P Onset 141 ms    P Offset 187 ms    T Offset 421 ms    QTC Fredericia 436 ms   Drug Screen, Urine   Result Value Ref Range    Amphetamine Screen, Urine Presumptive Negative Presumptive Negative    Barbiturate Screen, Urine Presumptive Negative Presumptive Negative    Benzodiazepines Screen, Urine Presumptive Negative Presumptive Negative    Cannabinoid Screen, Urine Presumptive Positive (A) Presumptive Negative     Cocaine Metabolite Screen, Urine Presumptive Positive (A) Presumptive Negative    Fentanyl Screen, Urine Presumptive Negative Presumptive Negative    Opiate Screen, Urine Presumptive Negative Presumptive Negative    Oxycodone Screen, Urine Presumptive Negative Presumptive Negative    PCP Screen, Urine Presumptive Negative Presumptive Negative    Methadone Screen, Urine Presumptive Negative Presumptive Negative   Abo/Rh Group Test   Result Value Ref Range    ABO TYPE B     Rh TYPE POS    POCT GLUCOSE   Result Value Ref Range    POCT Glucose 76 74 - 99 mg/dL   POCT GLUCOSE   Result Value Ref Range    POCT Glucose 110 (H) 74 - 99 mg/dL       MR cervical spine w and wo IV contrast   Final Result   MRI brain:   1. No acute intracranial infarct or mass effect.   2. Moderate burden of nonspecific T2/FLAIR hyperintensities likely   related to chronic microangiopathic changes. Few regions of   encephalomalacia in associated with presumed prior infarcts.   3. Moderate soft tissue swelling overlying the right frontal scalp.        MRI cervical spine:   1. Multilevel degenerative disc disease and facet arthrosis most   pronounced at C3-C4 with severe spinal canal and neural foraminal   stenosis. There is mild compression of the cervical cord at this   level without convincing evidence of abnormal cord signal. No cord   enhancement.   2. Additional multilevel degenerative changes of the cervical spine   as detailed above with mild-to-moderate spinal canal stenosis.   Varying degrees of neural foraminal narrowing with moderate to severe   bilateral neural foraminal stenosis at C5-C6.   3. Multiple chronic appearing compression fractures of the mid   cervical spine.             I personally reviewed the image(s)/study and resident interpretation   as stated by Dr. Faith Kern MD. I agree with the findings as   stated. This study was interpreted at [facility], [place], [place].         MACRO:   None        Signed by: Malgorzata Bates 5/16/2024 11:28 AM   Dictation workstation:   [id number]      MR brain w and wo IV contrast   Final Result   MRI brain:   1. No acute intracranial infarct or mass effect.   2. Moderate burden of nonspecific T2/FLAIR hyperintensities likely   related to chronic microangiopathic changes. Few regions of   encephalomalacia in associated with presumed prior infarcts.   3. Moderate soft tissue swelling overlying the right frontal scalp.        MRI cervical spine:   1. Multilevel degenerative disc disease and facet arthrosis most   pronounced at C3-C4 with severe spinal canal and neural foraminal   stenosis. There is mild compression of the cervical cord at this   level without convincing evidence of abnormal cord signal. No cord   enhancement.   2. Additional multilevel degenerative changes of the cervical spine   as detailed above with mild-to-moderate spinal canal stenosis.   Varying degrees of neural foraminal narrowing with moderate to severe   bilateral neural foraminal stenosis at C5-C6.   3. Multiple chronic appearing compression fractures of the mid   cervical spine.             I personally reviewed the image(s)/study and resident interpretation   as stated by Dr. Faith Kern MD. I agree with the findings as   stated. This study was interpreted at [facility], [place], [place].        MACRO:   None        Signed by: Malgorzata Bates 5/16/2024 11:28 AM   Dictation workstation:   [id number]      CT head W O contrast trauma protocol   Final Result   CT HEAD:   1. No acute intracranial hemorrhage.   2. Small right frontal/periorbital hematoma without underlying   calvarial fracture.        CT MAXILLOFACIAL SKELETON:   Acute mildly displaced left nasal bone fracture.        Periapical lucencies noted. Dental follow-up suggested.        CT CERVICAL SPINE:   1. No acute fracture or traumatic malalignment of the cervical spine.   2.  Spondylotic changes of the cervical spine as detailed above, most   pronounced at C4-C5.        I personally reviewed the images/study and I agree with the findings   as stated by Dr. Zaid Silva M.D. This study was interpreted at   [place], [place].        MACRO:   None.        Signed by: Mariia Ro 5/16/2024 3:27 AM   Dictation workstation:   [id number]      CT cervical spine wo IV contrast   Final Result   CT HEAD:   1. No acute intracranial hemorrhage.   2. Small right frontal/periorbital hematoma without underlying   calvarial fracture.        CT MAXILLOFACIAL SKELETON:   Acute mildly displaced left nasal bone fracture.        Periapical lucencies noted. Dental follow-up suggested.        CT CERVICAL SPINE:   1. No acute fracture or traumatic malalignment of the cervical spine.   2. Spondylotic changes of the cervical spine as detailed above, most   pronounced at C4-C5.        I personally reviewed the images/study and I agree with the findings   as stated by Dr. Zaid Silva M.D. This study was interpreted at   [place], [place].        MACRO:   None.        Signed by: Mariia Ro 5/16/2024 3:27 AM   Dictation workstation:   [id number]      CT thoracic spine wo IV contrast   Final Result   CT CHEST/ABDOMEN/PELVIS:   1. No acute traumatic injury.   2. Subtle reticulonodular opacities in the right middle lobe which   may be infectious or inflammatory. Additional chronic biapical lung   changes with probable scarring and soft tissue.   3. Eccentric filling defect within the proximal SMA which could   represent sequela of a chronic dissection flap or thrombus resulting   in moderate intraluminal narrowing and distal reconstitution.             CT THORACIC AND LUMBAR SPINE:   1. No acute fracture or malalignment of the thoracolumbar spine.   2. Mild multilevel degenerative changes as detailed above.        I  personally reviewed the images/study and I agree with the findings   as stated by Dr. Zaid Silva M.D. This study was interpreted at   [place], [place].        MACRO:   None.        Signed by: Mariia Ro 5/16/2024 3:39 AM   Dictation workstation:   [id number]      CT lumbar spine wo IV contrast   Final Result   CT CHEST/ABDOMEN/PELVIS:   1. No acute traumatic injury.   2. Subtle reticulonodular opacities in the right middle lobe which   may be infectious or inflammatory. Additional chronic biapical lung   changes with probable scarring and soft tissue.   3. Eccentric filling defect within the proximal SMA which could   represent sequela of a chronic dissection flap or thrombus resulting   in moderate intraluminal narrowing and distal reconstitution.             CT THORACIC AND LUMBAR SPINE:   1. No acute fracture or malalignment of the thoracolumbar spine.   2. Mild multilevel degenerative changes as detailed above.        I personally reviewed the images/study and I agree with the findings   as stated by Dr. Zaid Silva M.D. This study was interpreted at   [place], [place].        MACRO:   None.        Signed by: Mariia Ro 5/16/2024 3:39 AM   Dictation workstation:   [id number]      CT chest abdomen pelvis w IV contrast   Final Result   CT CHEST/ABDOMEN/PELVIS:   1. No acute traumatic injury.   2. Subtle reticulonodular opacities in the right middle lobe which   may be infectious or inflammatory. Additional chronic biapical lung   changes with probable scarring and soft tissue.   3. Eccentric filling defect within the proximal SMA which could   represent sequela of a chronic dissection flap or thrombus resulting   in moderate intraluminal narrowing and distal reconstitution.             CT THORACIC AND LUMBAR SPINE:   1. No acute fracture or malalignment of the thoracolumbar spine.   2. Mild multilevel  degenerative changes as detailed above.        I personally reviewed the images/study and I agree with the findings   as stated by Dr. Zaid Silva M.D. This study was interpreted at   [place], [place].        MACRO:   None.        Signed by: Mariia Ro 5/16/2024 3:39 AM   Dictation workstation:   [id number]      CT maxillofacial bones wo IV contrast   Final Result   CT HEAD:   1. No acute intracranial hemorrhage.   2. Small right frontal/periorbital hematoma without underlying   calvarial fracture.        CT MAXILLOFACIAL SKELETON:   Acute mildly displaced left nasal bone fracture.        Periapical lucencies noted. Dental follow-up suggested.        CT CERVICAL SPINE:   1. No acute fracture or traumatic malalignment of the cervical spine.   2. Spondylotic changes of the cervical spine as detailed above, most   pronounced at C4-C5.        I personally reviewed the images/study and I agree with the findings   as stated by Dr. Zaid Silva M.D. This study was interpreted at   [place], [place].        MACRO:   None.        Signed by: Mariia Ro 5/16/2024 3:27 AM   Dictation workstation:   [id number]      XR chest 1 view   Final Result   Emphysema/COPD without focal consolidation. Mild perihilar and   interstitial prominence bilaterally, which may represent pulmonary   edema, scarring or atypical infection.        I personally reviewed the images/study and I agree with the findings   as stated by Dr. Zaid Silva M.D. This study was interpreted at   [place], [place].        MACRO:   None        Signed by: Mariia Ro 5/16/2024 3:11 AM   Dictation workstation:   [id number]      XR pelvis 1-2 views   Final Result   No acute fracture or malalignment identified on this single, limited   view of the pelvis.        I personally reviewed the images/study and I agree with  the findings   as stated by Dr. Zaid Silva M.D. This study was interpreted at   [facility], [place], [place].        MACRO:   None        Signed by: Mariia Ro 5/16/2024 3:13 AM   Dictation workstation:   [id number]      XR cervical spine complete 4-5 views    (Results Pending)       Assessment/Plan:    Injury: c/f central cord syndrome    HPI: 69M (denies PMHx, does not see a doctor, UTox positive for cannabis and cocaine) p/a mGLF w headstrike last night w new BUE weakness. Decreased hand dexterity. Denies bowel/bladder sx or saddle anesthesia. Exam: RUE: 4/5 wrist ext, 2/5 finger abd; LUE: 4/5 elbow flexion, 4/5 Wrist Ext, 2/5 finger abd. 5/5 motor BLE. SILT throughout BUE/BLE. +Hoffmans RUE. CT C/T/L spine wo acute fx or traumatic malalignment. MRI C-spine w severe degenerative changes and severe cord compression at C3/4 and C4/5.    Plan: Maintain C-collar. C/p posterior cervical decompression and fusion w Dr. Shabazz 5/17. Clearance pending trauma eval.    Plan:   - NPO at midnight for upcoming surgery with orthopedics.  - Admit to trauma , clearance pending for OR tomorrow; Appreciate documentation of clearance by primary team  - Pre-operative labs/studies COMPLETE  - Consented and posted to OR schedule for posterior cervical decompression and fusion w/ orthopedic surgery on 5/17  - Strict Bedrest, MAINTAIN C-collar, strict c-spine precautions  - Pre-operative ABx: None indicated  - No indication for transfusion pre-operatively, 2U PRBC on hold for OR   - DVT PPx: SCDs, please hold DVT ppx    Consult seen and evaluated within 30 minutes of notification.     This consult was staffed with attending physician, Dr. Shabazz.    Hermilo Hill MD  Orthopaedic Surgery PGY-1  Resident On-Call  Pager: [phone]  Available via Epic Chat    While admitted, this patient will be followed by the Ortho Spine Team. Please contact below residents with any questions (available via Epic  Chat).     First call: Meir Gramajo, PGY-2   Second call: Maverick Zhu, PGY-4    Orthopedic spine attending    As noted, this 69-year-old man presents with a clinical picture suggestive of a central cord syndrome.  I have personally evaluated the patient, examined him, and taken a history.    Of note, he has recently used cocaine and has cocaine in his system.    We have discussed his situation at length with he and his brother.    Surgery is indicated to address his spinal cord compression.  His neurologic exam has been unchanged since his admission.  I would recommend allowing the cocaine to clear his system.  I think the potential for blood pressure instability with a general anesthetic with the associated cocaine toxicity would put him at unacceptably high risk of further insult to his spinal cord.    I would recommend prompt surgery within the next 24 to 48 hours.    We have discussed the risks and benefits and expectations of surgery.  He understands and we will proceed when appropriate.    ** Dictated with voice recognition software and not immediately reviewed for errors in grammar and/or spelling **

## 2024-05-17 NOTE — PROGRESS NOTES
[facility]  TRAUMA ICU - PROGRESS NOTE    Patient Name: Jimmy Carpenter  MRN: [id number]  Admit Date: 516  : 1955  AGE: 69 y.o.   GENDER: male  ==============================================================================  MECHANISM OF INJURY / CHIEF COMPLAINT:   68 yo M fall from standing, unclear mechanical vs. Syncopal. Fell striking face, unknown LOC. Pt. Endorses using cocaine today.   LOC (yes/no?): Unknown.   Anticoagulant / Anti-platelet Rx? (for what dx?): No  Referring Facility Name (N/A for scene EMR run): NA     Injuries/problems:  LUE weakness with severe cervical stenosis and mild cord compression  Mildly displaced L nasal bone fx  Cocaine use d/o     INCIDENTAL FINDINGS:  prox sma filing defect (possible thrombus vs. Chronic dissection flap)    PROCEDURES:        ==============================================================================  TODAY'S ASSESSMENT AND PLAN OF CARE:  Jimmy Carpenter is a 69 y.o. male in the ICU due to: Neurochecks every 1 hour    NEURO/PAIN/SEDATION:   -Orthospine consulted, OR in am for severe canal stenosis of the cervical spine at C3-C4.  - Tylenol and 0.2 prn dilaudid as needed for pain control     RESPIRATORY: IS. Continuous pulse ox.     CARDIOVASC: Continuous cardiac monitoring. 5mg hydral pushes as needed for     GI: Reg diet. BR. NPO      /FEN: Strict I/Os. AM labs. Replete electrolytes as indicated. MIVF, NS at 75 ml/hr.    HEMATOLOGIC: Monitor for signs and symptoms of acute blood loss anemia.     ENDOCRINE: Blood glucose checks every 6 hours to monitor for hypoglycemia. Glucose  in past 24 hours     MUSCULOSKELETAL/SKIN: PT/OT when able. Strict cervical spine precautions. Maintain aspen collar.     INFECTIOUS DISEASE: No active issues.     GI PROPHYLAXIS: Not indicated.     DVT PROPHYLAXIS: Hold lvx in the setting spine surgery. SCDs.     DISPOSITION: continue TSICU care. OR this morning ().     Pt. Seen and  "discussed with Dr. Jacinto.     Jamie Posada PA-C  Trauma Surgery  [id number]    Total face to face time spent with patient/family of 30 minutes critical care time, with >50% of the time spent discussing plan of care/management, counseling/educating on disease processes, explaining results of diagnostic testing.       ==============================================================================  CHIEF COMPLAINT / OVERNIGHT EVENTS / HPI:   Patient was seen laying in bed with C-collar on, appearing comfortable. Patient has no new complaints at this time. Patient denies any chest pain, SOB, nausea, vomiting, change in Bms, or dysuria. Patient is going to OR today with Orthospine for decompression.     MEDICAL HISTORY / ROS:  Admission history and ROS reviewed. Pertinent changes as follows:  Complaint of numbness/ tingling LUE.     PHYSICAL EXAM:  Heart Rate:  [60-84]   Temp:  [36.5 °C (97.7 °F)-37.4 °C (99.3 °F)]   Resp:  [10-27]   BP: (129-183)/()   Height:  [177.9 cm (5' 10.04\")]   Weight:  [58.8 kg (129 lb 10.1 oz)]   SpO2:  [95 %-100 %]   Physical Exam  Vitals reviewed.   Constitutional:       General: He is not in acute distress.     Appearance: He is not ill-appearing.      Comments: AOx3   HENT:      Head: Normocephalic.      Comments: 2 cm superficial laceration above right eyebrow     Right Ear: External ear normal.      Left Ear: External ear normal.      Nose:      Comments: 1cm superficial lac to nasal bridge      Mouth/Throat:      Mouth: Mucous membranes are moist.      Pharynx: Oropharynx is clear.   Eyes:      Extraocular Movements: Extraocular movements intact.      Pupils: Pupils are equal, round, and reactive to light.   Neck:      Comments: Aspen collar in place  Cardiovascular:      Rate and Rhythm: Normal rate.   Pulmonary:      Breath sounds: Normal breath sounds.      Comments: On room air. Respirations even and unlabored. Thorax symmetric.   Abdominal:      General: Bowel sounds are normal. " There is no distension.      Palpations: Abdomen is soft.      Tenderness: There is no abdominal tenderness.   Genitourinary:     Comments: External catheter in place  Musculoskeletal:         General: No swelling, tenderness or deformity.      Right lower leg: No edema.      Left lower leg: No edema.   Skin:     General: Skin is warm.      Capillary Refill: Capillary refill takes less than 2 seconds.   Neurological:      Mental Status: He is alert and oriented to person, place, and time.      Comments: RUE and BLE 5/5 strength. Numbness/ tingling in LUE. LUE  strength 3/5. Able to flex extend left forearm, unable to lift left elbow off bed. Positive sensation throughout.    Psychiatric:         Mood and Affect: Mood normal.         Behavior: Behavior normal.             LABS:  Results from last 7 days   Lab Units 05/17/24  0014 05/16/24  0152   WBC AUTO x10*3/uL 5.8 6.4   HEMOGLOBIN g/dL 15.4 13.4*   HEMATOCRIT % 43.5 38.6*   PLATELETS AUTO x10*3/uL 205 80*   LYMPHO PCT MAN %  --  39.5   MONO PCT MAN %  --  14.0   EOSINO PCT MAN %  --  0.9     Results from last 7 days   Lab Units 05/16/24  0152   INR  0.9     Results from last 7 days   Lab Units 05/17/24  0014 05/16/24  0152   SODIUM mmol/L 138 138   POTASSIUM mmol/L 3.9 3.6   CHLORIDE mmol/L 105 102   CO2 mmol/L 26 26   BUN mg/dL 11 12   CREATININE mg/dL 0.85 0.97   CALCIUM mg/dL 7.6* 8.2*   PROTEIN TOTAL g/dL  --  6.9   BILIRUBIN TOTAL mg/dL  --  0.8   ALK PHOS U/L  --  83   ALT U/L  --  11   AST U/L  --  16   GLUCOSE mg/dL 108* 82     Results from last 7 days   Lab Units 05/16/24  0152   BILIRUBIN TOTAL mg/dL 0.8         I have reviewed all medications, laboratory results, and imaging pertinent for today's encounter.

## 2024-05-17 NOTE — PROGRESS NOTES
Physical Therapy                 Therapy Communication Note    Patient Name: Jimmy Carpenter  MRN: [id number]  Today's Date: 5/17/2024     Discipline: Physical Therapy     Missed Visit Reason:  (0842: Per RN pt pending OR, will hold PT eval/mobility and re-attempt post op as able/appropriate.)    Missed Time: Attempt

## 2024-05-17 NOTE — H&P
[facility] Department of Orthopaedic Surgery   Surgical History & Physical <30 Days  05/17/24    Reason for Surgery: central cord syndrome  Planned Procedure: posterior cervical decompression and fusion    History & Physical Reviewed:  I have reviewed the History and Physical for obtained within the last 30 days. Dated 5/16. Relevant findings and updates are noted below:  No significant changes.    Home medications were reviewed with significant updates noted below:  No significant changes.    ERAS patient?: No    COVID-19 Risk Consent:   Surgeon has reviewed the key risks related to carol COVID-19 and subsequent sequelae.     Proceed with scheduled surgery.     05/17/24 at 12:01 AM - Jeremi Adkins MD

## 2024-05-17 NOTE — CARE PLAN
The patient's goals for the shift include  maintain pain free    The clinical goals for the shift include Maintain hemodynamically stable    Over the shift, the patient did make progress toward the following goals.

## 2024-05-17 NOTE — PROGRESS NOTES
Patient Seen and examined this morning. No acute events overnight. Going to surgery today with spine.     AFVSS  NAD  GCS 15  No respiratory distress  RRR  Soft, NT,ND  Appropriate mood  LUE N/t still persists,  str LUE 4/5, unable to lift LUE    69 year old male with central cord syndrome with plans for decompression and fusion with spin    OK to proceed with operative intervention  Return to TSICU, will continue to monitor and manage  NPO, IVF    Trevin Keller DO

## 2024-05-17 NOTE — BRIEF OP NOTE
Date: 2024  OR Location: [facility] OR    Name: Jimmy Carpenter, : 1955, Age: 69 y.o., MRN: [id number], Sex: male    Diagnosis  Pre-op Diagnosis     * Cervical stenosis of spinal canal [M48.02]     * Central cord syndrome of cervical spinal cord, initial encounter (Multi) [S14.129A] Post-op Diagnosis     * Cervical stenosis of spinal canal [M48.02]     * Central cord syndrome of cervical spinal cord, initial encounter (Multi) [S14.129A]     Procedures  C3 TO C5 Decompression and Fusion Spine Posterior Cervical  30643 - CO ARTHRD PST/PSTLAT TQ 1NTRSPC CRV BELW C2 SEGMENT    C3 TO C5 Decompression and Fusion Spine Posterior Cervical  71965 - CO ARTHRODESIS PST/PSTLAT TQ [facility]C [facility]      Surgeons      * Satnam Shabazz - Primary    Resident/Fellow/Other Assistant:  Surgeons and Role:     * Maverick Zhu MD - Resident - Assisting    Procedure Summary  Anesthesia: General  ASA: III  Anesthesia Staff: Anesthesiologist: Lauri Hogan MD  Anesthesia Resident: Lama Radha MD; Javy Trevizo MD  Estimated Blood Loss: 15mL  Intra-op Medications:   Administrations occurring from  to  on 24:   Medication Name Total Dose   bacitracin ointment 1 Application   polymyxin B injection 1,000,000 Units   sodium chloride 0.9 % irrigation solution 1,000 mL   thrombin (recombinant) (Recothrom) topical solution 10,000 Units   gelatin absorbable (Gelfoam) 100 sponge 1 each   acetaminophen (Tylenol) tablet 650 mg Cannot be calculated   docusate sodium (Colace) capsule 100 mg Cannot be calculated   HYDROmorphone (Dilaudid) injection 0.2 mg Cannot be calculated   polyethylene glycol (Glycolax, Miralax) packet 17 g Cannot be calculated   sennosides (Senokot) tablet 8.6 mg Cannot be calculated   sodium chloride 0.9% infusion Cannot be calculated              Anesthesia Record               Intraprocedure I/O Totals          Intake    Remifentanil Drip 0.00 mL    The total shown is the total  volume documented since Anesthesia Start was filed.    Propofol Drip 0.00 mL    The total shown is the total volume documented since Anesthesia Start was filed.    Total Intake 0 mL          Specimen: No specimens collected     Staff:   Circulator: Lauri Cheney RN  Relief Circulator: Sandip Jacobo RN  Relief Scrub: Julia Martinez; Lauri Cheney RN  Scrub Person: Chandler Stallworth          Findings: c/w diagnosis    Complications:  None; patient tolerated the procedure well.     Disposition: PACU - hemodynamically stable.  Condition: stable  Specimens Collected: No specimens collected  Attending Attestation: I was present and scrubbed for the entire procedure.    Satnam Shabazz  Phone Number: [phone]

## 2024-05-17 NOTE — ANESTHESIA PROCEDURE NOTES
Airway  Date/Time: 5/17/2024 4:56 PM  Urgency: elective    Airway not difficult    Staffing  Performed: resident   Authorized by: Lauri Hogan MD    Performed by: Lama Radha MD  Patient location during procedure: OR    Indications and Patient Condition  Indications for airway management: anesthesia  Spontaneous ventilation: present  Sedation level: deep  Preoxygenated: yes  Patient position: sniffing  MILS maintained throughout  Mask difficulty assessment: 1 - vent by mask  Planned trial extubation    Final Airway Details  Final airway type: endotracheal airway      Successful airway: ETT  Cuffed: yes   Successful intubation technique: video laryngoscopy  Facilitating devices/methods: intubating stylet  Endotracheal tube insertion site: oral  ETT size (mm): 7.5  Cormack-Lehane Classification: grade IIa - partial view of glottis  Placement verified by: chest auscultation and capnometry   Cuff volume (mL): 10  Measured from: teeth  ETT to teeth (cm): 22  Number of attempts at approach: 1

## 2024-05-17 NOTE — SIGNIFICANT EVENT
RCRI 0, Class I Risk, 3.9% 30-day risk of death, MI, or cardiac arrest. Given the urgency of orthopedic surgical intervention and review of the above risk factors, there is no additional workup necessary from a trauma perspective. Final risk versus benefits of all surgical procedures is based on the surgical and the anesthesia teams' assessments. Patient is medically optimized for surgery from a trauma surgery perspective at this time.    Jamie Posada PA-C  Trauma Surgery  [id number]

## 2024-05-17 NOTE — ANESTHESIA PREPROCEDURE EVALUATION
Patient: Jimmy Carpenter    Procedure Information       Date/Time: 05/17/24 1500    Procedure: Decompression and Fusion Spine Posterior Cervical (Spine Cervical)    Location: [facility] OR 10 / Virtual [facility] OR    Surgeons: Satnam Shabazz MD            Relevant Problems   Anesthesia  No Hx of surgery      Cardiac   (+) Primary hypertension      Musculoskeletal   (+) Cervical stenosis of spinal canal      Mental Health   (+) Cocaine abuse (Multi)       Clinical information reviewed:    Allergies  Meds   Med Hx  Surg Hx   Fam Hx  Soc Hx        NPO Detail:  NPO/Void Status  Date of Last Liquid: 05/17/24  Time of Last Liquid: 0000  Date of Last Solid: 05/17/24  Time of Last Solid: 0000         Physical Exam    Airway  Mallampati: III  Neck ROM: limited  Comments: C-collar    Cardiovascular - normal exam     Dental        Pulmonary - normal exam     Abdominal            Anesthesia Plan    History of general anesthesia?: yes  History of complications of general anesthesia?: no    ASA 3 - emergent     general     intravenous induction   Anesthetic plan and risks discussed with patient.  Use of blood products discussed with patient who consented to blood products.    Plan discussed with resident.

## 2024-05-18 LAB
ALBUMIN SERPL BCP-MCNC: 3.6 G/DL (ref 3.4–5)
ANION GAP BLDA CALCULATED.4IONS-SCNC: 8 MMO/L (ref 10–25)
ANION GAP SERPL CALC-SCNC: 13 MMOL/L (ref 10–20)
APTT PPP: 28 SECONDS (ref 27–38)
BASE EXCESS BLDA CALC-SCNC: 3 MMOL/L (ref -2–3)
BODY TEMPERATURE: 37 DEGREES CELSIUS
BUN SERPL-MCNC: 9 MG/DL (ref 6–23)
CA-I BLD-SCNC: 1.07 MMOL/L (ref 1.1–1.33)
CA-I BLDA-SCNC: 1.08 MMOL/L (ref 1.1–1.33)
CALCIUM SERPL-MCNC: 8.2 MG/DL (ref 8.6–10.6)
CHLORIDE BLDA-SCNC: 101 MMOL/L (ref 98–107)
CHLORIDE SERPL-SCNC: 101 MMOL/L (ref 98–107)
CO2 SERPL-SCNC: 25 MMOL/L (ref 21–32)
CREAT SERPL-MCNC: 0.88 MG/DL (ref 0.5–1.3)
EGFRCR SERPLBLD CKD-EPI 2021: >90 ML/MIN/1.73M*2
ERYTHROCYTE [DISTWIDTH] IN BLOOD BY AUTOMATED COUNT: 14.6 % (ref 11.5–14.5)
GLUCOSE BLD MANUAL STRIP-MCNC: 136 MG/DL (ref 74–99)
GLUCOSE BLD MANUAL STRIP-MCNC: 153 MG/DL (ref 74–99)
GLUCOSE BLD MANUAL STRIP-MCNC: 85 MG/DL (ref 74–99)
GLUCOSE BLD MANUAL STRIP-MCNC: 98 MG/DL (ref 74–99)
GLUCOSE BLDA-MCNC: 125 MG/DL (ref 74–99)
GLUCOSE SERPL-MCNC: 117 MG/DL (ref 74–99)
HCO3 BLDA-SCNC: 27.1 MMOL/L (ref 22–26)
HCT VFR BLD AUTO: 44.4 % (ref 41–52)
HCT VFR BLD EST: 46 % (ref 41–52)
HGB BLD-MCNC: 14.7 G/DL (ref 13.5–17.5)
HGB BLDA-MCNC: 15.4 G/DL (ref 13.5–17.5)
INHALED O2 CONCENTRATION: 21 %
INR PPP: 1 (ref 0.9–1.1)
LACTATE BLDA-SCNC: 0.9 MMOL/L (ref 0.4–2)
MAGNESIUM SERPL-MCNC: 2.57 MG/DL (ref 1.6–2.4)
MCH RBC QN AUTO: 28.5 PG (ref 26–34)
MCHC RBC AUTO-ENTMCNC: 33.1 G/DL (ref 32–36)
MCV RBC AUTO: 86 FL (ref 80–100)
NRBC BLD-RTO: 0 /100 WBCS (ref 0–0)
OXYHGB MFR BLDA: 96.6 % (ref 94–98)
PCO2 BLDA: 39 MM HG (ref 38–42)
PH BLDA: 7.45 PH (ref 7.38–7.42)
PHOSPHATE SERPL-MCNC: 2.7 MG/DL (ref 2.5–4.9)
PLATELET # BLD AUTO: 265 X10*3/UL (ref 150–450)
PO2 BLDA: 100 MM HG (ref 85–95)
POTASSIUM BLDA-SCNC: 3.6 MMOL/L (ref 3.5–5.3)
POTASSIUM SERPL-SCNC: 3.8 MMOL/L (ref 3.5–5.3)
PROTHROMBIN TIME: 11 SECONDS (ref 9.8–12.8)
RBC # BLD AUTO: 5.15 X10*6/UL (ref 4.5–5.9)
SAO2 % BLDA: 99 % (ref 94–100)
SODIUM BLDA-SCNC: 132 MMOL/L (ref 136–145)
SODIUM SERPL-SCNC: 135 MMOL/L (ref 136–145)
WBC # BLD AUTO: 9.2 X10*3/UL (ref 4.4–11.3)

## 2024-05-18 PROCEDURE — 82330 ASSAY OF CALCIUM: CPT | Performed by: NURSE PRACTITIONER

## 2024-05-18 PROCEDURE — 1200000002 HC GENERAL ROOM WITH TELEMETRY DAILY

## 2024-05-18 PROCEDURE — 2500000006 HC RX 250 W HCPCS SELF ADMINISTERED DRUGS (ALT 637 FOR ALL PAYERS): Performed by: NURSE PRACTITIONER

## 2024-05-18 PROCEDURE — 37799 UNLISTED PX VASCULAR SURGERY: CPT | Performed by: NURSE PRACTITIONER

## 2024-05-18 PROCEDURE — 2500000004 HC RX 250 GENERAL PHARMACY W/ HCPCS (ALT 636 FOR OP/ED): Performed by: NURSE PRACTITIONER

## 2024-05-18 PROCEDURE — 84132 ASSAY OF SERUM POTASSIUM: CPT | Mod: 91 | Performed by: NURSE PRACTITIONER

## 2024-05-18 PROCEDURE — 85610 PROTHROMBIN TIME: CPT | Performed by: NURSE PRACTITIONER

## 2024-05-18 PROCEDURE — 97162 PT EVAL MOD COMPLEX 30 MIN: CPT | Mod: GP

## 2024-05-18 PROCEDURE — 85027 COMPLETE CBC AUTOMATED: CPT | Performed by: NURSE PRACTITIONER

## 2024-05-18 PROCEDURE — 82947 ASSAY GLUCOSE BLOOD QUANT: CPT

## 2024-05-18 PROCEDURE — 97165 OT EVAL LOW COMPLEX 30 MIN: CPT | Mod: GO

## 2024-05-18 PROCEDURE — 83735 ASSAY OF MAGNESIUM: CPT | Performed by: NURSE PRACTITIONER

## 2024-05-18 PROCEDURE — 99232 SBSQ HOSP IP/OBS MODERATE 35: CPT | Performed by: SURGERY

## 2024-05-18 RX ORDER — CALCIUM GLUCONATE 20 MG/ML
1 INJECTION, SOLUTION INTRAVENOUS ONCE
Status: COMPLETED | OUTPATIENT
Start: 2024-05-18 | End: 2024-05-18

## 2024-05-18 RX ORDER — POTASSIUM CHLORIDE 20 MEQ/1
20 TABLET, EXTENDED RELEASE ORAL ONCE
Status: COMPLETED | OUTPATIENT
Start: 2024-05-18 | End: 2024-05-18

## 2024-05-18 RX ADMIN — CEFAZOLIN SODIUM 2 G: 2 INJECTION, SOLUTION INTRAVENOUS at 16:01

## 2024-05-18 RX ADMIN — HYDROMORPHONE HYDROCHLORIDE 0.2 MG: 1 INJECTION, SOLUTION INTRAMUSCULAR; INTRAVENOUS; SUBCUTANEOUS at 00:33

## 2024-05-18 RX ADMIN — CEFAZOLIN SODIUM 2 G: 2 INJECTION, SOLUTION INTRAVENOUS at 08:51

## 2024-05-18 RX ADMIN — CEFAZOLIN SODIUM 2 G: 2 INJECTION, SOLUTION INTRAVENOUS at 00:34

## 2024-05-18 RX ADMIN — HYDRALAZINE HYDROCHLORIDE 10 MG: 20 INJECTION INTRAMUSCULAR; INTRAVENOUS at 00:54

## 2024-05-18 RX ADMIN — POTASSIUM CHLORIDE 20 MEQ: 1500 TABLET, EXTENDED RELEASE ORAL at 03:40

## 2024-05-18 RX ADMIN — CALCIUM GLUCONATE 1 G: 20 INJECTION, SOLUTION INTRAVENOUS at 03:40

## 2024-05-18 ASSESSMENT — PAIN - FUNCTIONAL ASSESSMENT
PAIN_FUNCTIONAL_ASSESSMENT: 0-10

## 2024-05-18 ASSESSMENT — COGNITIVE AND FUNCTIONAL STATUS - GENERAL
WALKING IN HOSPITAL ROOM: A LITTLE
CLIMB 3 TO 5 STEPS WITH RAILING: A LOT
EATING MEALS: A LOT
HELP NEEDED FOR BATHING: A LOT
STANDING UP FROM CHAIR USING ARMS: A LITTLE
MOVING FROM LYING ON BACK TO SITTING ON SIDE OF FLAT BED WITH BEDRAILS: A LITTLE
PERSONAL GROOMING: A LOT
HELP NEEDED FOR BATHING: A LOT
DRESSING REGULAR UPPER BODY CLOTHING: A LOT
WALKING IN HOSPITAL ROOM: A LITTLE
MOBILITY SCORE: 16
EATING MEALS: A LOT
DAILY ACTIVITIY SCORE: 12
MOVING FROM LYING ON BACK TO SITTING ON SIDE OF FLAT BED WITH BEDRAILS: A LITTLE
MOVING TO AND FROM BED TO CHAIR: A LITTLE
TOILETING: A LOT
DAILY ACTIVITIY SCORE: 12
DRESSING REGULAR LOWER BODY CLOTHING: A LOT
DRESSING REGULAR UPPER BODY CLOTHING: A LOT
CLIMB 3 TO 5 STEPS WITH RAILING: A LOT
STANDING UP FROM CHAIR USING ARMS: A LITTLE
MOBILITY SCORE: 16
PERSONAL GROOMING: A LOT
TURNING FROM BACK TO SIDE WHILE IN FLAT BAD: A LOT
TOILETING: A LOT
MOVING TO AND FROM BED TO CHAIR: A LITTLE
TURNING FROM BACK TO SIDE WHILE IN FLAT BAD: A LOT
DRESSING REGULAR LOWER BODY CLOTHING: A LOT

## 2024-05-18 ASSESSMENT — ACTIVITIES OF DAILY LIVING (ADL)
BATHING_ASSISTANCE: MAXIMAL
ADL_ASSISTANCE: INDEPENDENT
ADL_ASSISTANCE: INDEPENDENT

## 2024-05-18 ASSESSMENT — PAIN SCALES - GENERAL
PAINLEVEL_OUTOF10: 0 - NO PAIN
PAINLEVEL_OUTOF10: 9
PAINLEVEL_OUTOF10: 9
PAINLEVEL_OUTOF10: 0 - NO PAIN
PAINLEVEL_OUTOF10: 0 - NO PAIN

## 2024-05-18 ASSESSMENT — PAIN DESCRIPTION - ORIENTATION: ORIENTATION: POSTERIOR

## 2024-05-18 ASSESSMENT — PAIN DESCRIPTION - LOCATION: LOCATION: NECK

## 2024-05-18 NOTE — NURSING NOTE
1423: EPIC messaged and Spoke with MD Ricketts about whether we are going off cuff or A line for pressures as there is a 40 point difference between the two. Per MD, remove A line. RN waiting for order to be placed.    1830: bedside RN report called to Inez with LT 8. Pt belongings are on bed, pt does not require tele according to MD Ricketts    1851: pt transferred to LT 8 with belongings

## 2024-05-18 NOTE — ANESTHESIA POSTPROCEDURE EVALUATION
Patient: Jimmy Carpenter    Procedure Summary       Date: 05/17/24 Room / Location: [facility] OR 10 / Virtual [facility] OR    Anesthesia Start: 1647 Anesthesia Stop: 2000    Procedure: C3 TO C5 Decompression and Fusion Spine Posterior Cervical (Spine Cervical) Diagnosis:       Cervical stenosis of spinal canal      Central cord syndrome of cervical spinal cord, initial encounter (Multi)      (Cervical stenosis of spinal canal [M48.02])      (Central cord syndrome of cervical spinal cord, initial encounter (Multi) [S14.129A])    Surgeons: Satnam Shabazz MD Responsible Provider: Lauri Hogan MD    Anesthesia Type: general ASA Status: 3 - Emergent            Anesthesia Type: general    Vitals Value Taken Time   /97 05/17/24 2001   Temp 37.3 05/17/24 2001   Pulse 76 05/17/24 2000   Resp 15 05/17/24 2000   SpO2 100 % 05/17/24 2000   Vitals shown include unfiled device data.    Anesthesia Post Evaluation    Patient location during evaluation: ICU  Patient participation: waiting for patient participation  Level of consciousness: awake and alert  Pain score: 1  Pain management: adequate  Airway patency: patent  Cardiovascular status: acceptable  Respiratory status: acceptable  Hydration status: acceptable  Postoperative Nausea and Vomiting: none        There were no known notable events for this encounter.

## 2024-05-18 NOTE — PROGRESS NOTES
"Orthopaedic Surgery Progress Note    Subjective:  No events overnight. Subjectively feeling well. Understandably frustrated by UE weakness. Denies fevers, chills, nausea, vomiting, chest pain, shortness of breath, or new numbness/tingling.      Objective:    Vitals:  /84   Pulse 75   Temp 37 °C (98.6 °F)   Resp 11   Ht 1.779 m (5' 10.04\")   Wt 63 kg (138 lb 14.2 oz)   SpO2 100%   BMI 19.91 kg/m²     Physical Exam:  - Constitutional: No acute distress  - Eyes: EOM grossly intact  - Head/Neck: Trachea midline  - Respiratory/Thorax: Normal work of breathing  - Cardiovascular: RRR on peripheral palpation  - Gastrointestinal: Nondistended  - Psychological: Appropriate mood/behavior  - Skin: Warm and dry.     - Musculoskeletal:  - soft collar    Spine Exam:  C5: Elbow flexion:  4/5 Left; 4/5 Right  C6: Wrist Ext: 3/5 Left; 3/5 Right  C7: Triceps: 4/5 Left; 4/5 Right  C8: Finger flexion: 4/5 Left; 4/5 Right  T1: Interossei: 3/5 Left; 3/5 Right     Morales: Positive    Lab Results:  Results from last 7 days   Lab Units 05/18/24 0144 05/17/24 2004 05/17/24  0014   WBC AUTO x10*3/uL 9.2 9.6 5.8   HEMOGLOBIN g/dL 14.7 14.8 15.4   HEMATOCRIT % 44.4 43.5 43.5   PLATELETS AUTO x10*3/uL 265 262 205         Results from last 7 days   Lab Units 05/18/24 0144 05/17/24 2004 05/17/24  0014   SODIUM mmol/L 135* 138 138   POTASSIUM mmol/L 3.8 3.9 3.9   CHLORIDE mmol/L 101 102 105   CO2 mmol/L 25 24 26   BUN mg/dL 9 9 11   CREATININE mg/dL 0.88 0.89 0.85   GLUCOSE mg/dL 117* 128* 108*   CALCIUM mg/dL 8.2* 8.3* 7.6*         Medications:  Scheduled medications  ceFAZolin, 2 g, intravenous, q8h  docusate sodium, 100 mg, oral, BID  sennosides, 1 tablet, oral, BID      Continuous medications  sodium chloride 0.9%, 50 mL/hr, Last Rate: 50 mL/hr (05/18/24 0628)      PRN medications  PRN medications: acetaminophen, hydrALAZINE, HYDROmorphone, polyethylene glycol    Assessment:  69M with central cord syndrome. Plan for cervical " decompression and fusion today with Dr. Shabazz.    Plan:   - Soft collar, no head of bed restrictions, OOB as tolerated  - Perioperative Ancef x48 hours  - DVX ppx: SCDs, ambulation, please hold chemo DVT ppx in setting of spine surgery  - maintain HV drain x1, please monitor and record output q8hrs, 70cc out since surgery  - Analgesia per primary, standard ortho post operative pain regimen includes Tylenol 650mg, oxy 5mg/10mg PRN, Dilaudid 0.4mg PRN  - no need for steroids  - OT/PT  - Ortho Spine will continue to follow    Maverick Zhu MD MARVIN  Orthopaedic Surgery, PGY-4  p. [phone]  Epic Chat Preferred      Maverick Zhu MD MARVIN  PGY-4 Orthopedic Surgery  Available by Epic Message    This patient will be followed by the Ortho Spine Team. Trivie Chat preferred.    First Call: Meir Gramajo, PGY-2  Second Call: Maverick Zhu, PGY-4
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
## 2024-05-19 LAB
ALBUMIN SERPL BCP-MCNC: 3.5 G/DL (ref 3.4–5)
ANION GAP SERPL CALC-SCNC: 14 MMOL/L (ref 10–20)
BUN SERPL-MCNC: 8 MG/DL (ref 6–23)
CALCIUM SERPL-MCNC: 8.4 MG/DL (ref 8.6–10.6)
CHLORIDE SERPL-SCNC: 101 MMOL/L (ref 98–107)
CO2 SERPL-SCNC: 27 MMOL/L (ref 21–32)
CREAT SERPL-MCNC: 0.84 MG/DL (ref 0.5–1.3)
EGFRCR SERPLBLD CKD-EPI 2021: >90 ML/MIN/1.73M*2
GLUCOSE SERPL-MCNC: 129 MG/DL (ref 74–99)
PHOSPHATE SERPL-MCNC: 2.5 MG/DL (ref 2.5–4.9)
POTASSIUM SERPL-SCNC: 3.6 MMOL/L (ref 3.5–5.3)
SODIUM SERPL-SCNC: 138 MMOL/L (ref 136–145)

## 2024-05-19 PROCEDURE — 2500000006 HC RX 250 W HCPCS SELF ADMINISTERED DRUGS (ALT 637 FOR ALL PAYERS): Performed by: PHYSICIAN ASSISTANT

## 2024-05-19 PROCEDURE — 1100000001 HC PRIVATE ROOM DAILY

## 2024-05-19 PROCEDURE — 36415 COLL VENOUS BLD VENIPUNCTURE: CPT | Performed by: PHYSICIAN ASSISTANT

## 2024-05-19 PROCEDURE — 2500000004 HC RX 250 GENERAL PHARMACY W/ HCPCS (ALT 636 FOR OP/ED): Mod: JZ | Performed by: NURSE PRACTITIONER

## 2024-05-19 PROCEDURE — 2500000004 HC RX 250 GENERAL PHARMACY W/ HCPCS (ALT 636 FOR OP/ED): Performed by: PHYSICIAN ASSISTANT

## 2024-05-19 PROCEDURE — 2500000001 HC RX 250 WO HCPCS SELF ADMINISTERED DRUGS (ALT 637 FOR MEDICARE OP)

## 2024-05-19 PROCEDURE — 80069 RENAL FUNCTION PANEL: CPT | Performed by: PHYSICIAN ASSISTANT

## 2024-05-19 RX ORDER — POTASSIUM CHLORIDE 20 MEQ/1
40 TABLET, EXTENDED RELEASE ORAL ONCE
Status: COMPLETED | OUTPATIENT
Start: 2024-05-19 | End: 2024-05-19

## 2024-05-19 RX ORDER — OXYCODONE HYDROCHLORIDE 5 MG/1
2.5 TABLET ORAL EVERY 6 HOURS PRN
Status: DISCONTINUED | OUTPATIENT
Start: 2024-05-19 | End: 2024-05-27

## 2024-05-19 RX ORDER — OXYCODONE HYDROCHLORIDE 5 MG/1
5 TABLET ORAL EVERY 6 HOURS PRN
Status: DISCONTINUED | OUTPATIENT
Start: 2024-05-19 | End: 2024-05-27

## 2024-05-19 RX ORDER — ENOXAPARIN SODIUM 100 MG/ML
30 INJECTION SUBCUTANEOUS EVERY 12 HOURS SCHEDULED
Status: DISCONTINUED | OUTPATIENT
Start: 2024-05-19 | End: 2024-05-27 | Stop reason: HOSPADM

## 2024-05-19 RX ADMIN — ENOXAPARIN SODIUM 30 MG: 100 INJECTION SUBCUTANEOUS at 17:08

## 2024-05-19 RX ADMIN — SENNOSIDES 8.6 MG: 8.6 TABLET, FILM COATED ORAL at 09:15

## 2024-05-19 RX ADMIN — DOCUSATE SODIUM 100 MG: 100 CAPSULE, LIQUID FILLED ORAL at 09:15

## 2024-05-19 RX ADMIN — CEFAZOLIN SODIUM 2 G: 2 INJECTION, SOLUTION INTRAVENOUS at 17:08

## 2024-05-19 RX ADMIN — SENNOSIDES 8.6 MG: 8.6 TABLET, FILM COATED ORAL at 20:15

## 2024-05-19 RX ADMIN — DOCUSATE SODIUM 100 MG: 100 CAPSULE, LIQUID FILLED ORAL at 20:15

## 2024-05-19 RX ADMIN — POTASSIUM CHLORIDE 40 MEQ: 1500 TABLET, EXTENDED RELEASE ORAL at 17:08

## 2024-05-19 RX ADMIN — CEFAZOLIN SODIUM 2 G: 2 INJECTION, SOLUTION INTRAVENOUS at 09:15

## 2024-05-19 RX ADMIN — CEFAZOLIN SODIUM 2 G: 2 INJECTION, SOLUTION INTRAVENOUS at 01:32

## 2024-05-19 ASSESSMENT — PAIN SCALES - GENERAL: PAINLEVEL_OUTOF10: 0 - NO PAIN

## 2024-05-19 NOTE — PROGRESS NOTES
[facility]  TRAUMA SERVICE - PROGRESS NOTE    Patient Name: Jimmy Carpenter  MRN: [id number]  Admit Date: 516  : 1955  AGE: 69 y.o.   GENDER: male  ==============================================================================  MECHANISM OF INJURY:   Cocaine-induced from fall from standing  LOC (yes/no?): unclear  Anticoagulant / Anti-platelet Rx? (for what dx?): no  Referring Facility Name (N/A for scene EMR run): n/a    Injuries/problems:  Central cord syndrome with pre-existing stenosis/compression  Mildly displaced L nasal bone fx  Cocaine use d/o    INCIDENTAL FINDINGS:  prox sma filing defect (possible thrombus vs. Chronic dissection flap)     PROCEDURES:  : C3-C5 laminoplasty    ==============================================================================  TODAY'S ASSESSMENT AND PLAN OF CARE:    ## Central cord syndrome, stenosis/compression  - Ortho spine following: soft collar, no HOB/bedrest restrictions, HV drain x1 to stay, to finished 48 hrs periop ancef today, follow up outpt for wound/clinical check Harika  - pt/ot rec spine cord rehab  - pain control with tylenol as need mild pain q6, oxycodone 2.5/5 q6 as needed moderate to severe pain    ## Nasal bone fx  - No in house consult, outpt ENT follow up as needed [phone]    ## Cocaine use d/o  - Refusing thrive resources, says he is no longer going to use    FEN/GI/:  - Benign abd exam  - reg diet  - colace/senna bid, as needed miralax  - voiding well   - RFP pend for today, stop IVF    Ppx:   - SCDs  - Lvx    Dispo: Medically clear spinal cord rehab    ==============================================================================  CHIEF COMPLAINT / OVERNIGHT EVENTS:   No adverse events since transfer out of ICU. No new/improved weakness on left. No new numbness/tingling. Eating without issue. Voiding and having Bms.     MEDICAL HISTORY / ROS:  Admission history and ROS reviewed. Pertinent changes as  follows:    PHYSICAL EXAM:  Heart Rate:  [72-89]   Temp:  [36.4 °C (97.5 °F)-37.7 °C (99.9 °F)]   Resp:  [11-18]   BP: (134-165)/(73-99)   SpO2:  [97 %-100 %]   Physical Exam    Physical Exam:   GEN: No acute distress  SKIN: Warm and dry  ENT: poor dentition, facial abrasion  Neck: soft collar in place, accordian drain serosang  CARDIO: Rate controlled rhythm  RESP: Nml resp rate RA without resp distress  GI: Soft, NT, ND  : deferred  MSK: CHAVEZ, 5/5 pf/df, 5/5 R hand , 4+/5 L hand   EXTREM: No pitting edema  NEURO: Alert and oriented with GCS 15, SILTx4  PSYCH: pleasant    IMAGING SUMMARY:  (summary of new imaging findings, not a copy of dictation)  No new imaging    LABS:  Results from last 7 days   Lab Units 05/18/24 0144 05/17/24 2004 05/17/24 0014 05/16/24 0152   WBC AUTO x10*3/uL 9.2 9.6 5.8 6.4   HEMOGLOBIN g/dL 14.7 14.8 15.4 13.4*   HEMATOCRIT % 44.4 43.5 43.5 38.6*   PLATELETS AUTO x10*3/uL 265 262 205 80*   LYMPHO PCT MAN %  --   --   --  39.5   MONO PCT MAN %  --   --   --  14.0   EOSINO PCT MAN %  --   --   --  0.9     Results from last 7 days   Lab Units 05/18/24 0144 05/17/24 2004 05/16/24 0152   APTT seconds 28 29  --    INR  1.0 1.0 0.9     Results from last 7 days   Lab Units 05/18/24 0144 05/17/24 2004 05/17/24 0014 05/16/24 0152   SODIUM mmol/L 135* 138 138 138   POTASSIUM mmol/L 3.8 3.9 3.9 3.6   CHLORIDE mmol/L 101 102 105 102   CO2 mmol/L 25 24 26 26   BUN mg/dL 9 9 11 12   CREATININE mg/dL 0.88 0.89 0.85 0.97   CALCIUM mg/dL 8.2* 8.3* 7.6* 8.2*   PROTEIN TOTAL g/dL  --   --   --  6.9   BILIRUBIN TOTAL mg/dL  --   --   --  0.8   ALK PHOS U/L  --   --   --  83   ALT U/L  --   --   --  11   AST U/L  --   --   --  16   GLUCOSE mg/dL 117* 128* 108* 82     Results from last 7 days   Lab Units 05/16/24  0152   BILIRUBIN TOTAL mg/dL 0.8     Results from last 7 days   Lab Units 05/18/24  0145 05/17/24 2005   POCT PH, ARTERIAL pH 7.45* 7.34*   POCT PCO2, ARTERIAL mm Hg 39 49*   POCT  PO2, ARTERIAL mm Hg 100* 204*   POCT HCO3 CALCULATED, ARTERIAL mmol/L 27.1* 26.4*   POCT BASE EXCESS, ARTERIAL mmol/L 3.0 -0.1       I have reviewed all medications, laboratory results, and imaging pertinent for today's encounter.

## 2024-05-19 NOTE — PROGRESS NOTES
"Orthopaedic Surgery Progress Note    Subjective:  No events overnight. Subjectively feeling well. Denies fevers, chills, nausea, vomiting, chest pain, shortness of breath. Reports small subjective improvement in L arm strength.      Objective:    Vitals:  /87 (BP Location: Right arm, Patient Position: Lying)   Pulse 72   Temp 37.4 °C (99.3 °F) (Temporal)   Resp 18   Ht 1.779 m (5' 10.04\")   Wt 63 kg (138 lb 14.2 oz)   SpO2 97%   BMI 19.91 kg/m²     Physical Exam:  - Constitutional: No acute distress  - Eyes: EOM grossly intact  - Head/Neck: Trachea midline  - Respiratory/Thorax: Normal work of breathing  - Cardiovascular: RRR on peripheral palpation  - Gastrointestinal: Nondistended  - Psychological: Appropriate mood/behavior  - Skin: Warm and dry.     - Musculoskeletal:  - soft collar    Spine Exam:  C5: Elbow flexion:  4/5 Left; 4/5 Right  C6: Wrist Ext: 3/5 Left; 3/5 Right  C7: Triceps: 4/5 Left; 4/5 Right  C8: Finger flexion: 4/5 Left; 4/5 Right  T1: Interossei: 3/5 Left; 3/5 Right     Morales: Positive    Lab Results:  Results from last 7 days   Lab Units 05/18/24 0144 05/17/24 2004 05/17/24  0014   WBC AUTO x10*3/uL 9.2 9.6 5.8   HEMOGLOBIN g/dL 14.7 14.8 15.4   HEMATOCRIT % 44.4 43.5 43.5   PLATELETS AUTO x10*3/uL 265 262 205         Results from last 7 days   Lab Units 05/18/24 0144 05/17/24 2004 05/17/24  0014   SODIUM mmol/L 135* 138 138   POTASSIUM mmol/L 3.8 3.9 3.9   CHLORIDE mmol/L 101 102 105   CO2 mmol/L 25 24 26   BUN mg/dL 9 9 11   CREATININE mg/dL 0.88 0.89 0.85   GLUCOSE mg/dL 117* 128* 108*   CALCIUM mg/dL 8.2* 8.3* 7.6*         Medications:  Scheduled medications  ceFAZolin, 2 g, intravenous, q8h  docusate sodium, 100 mg, oral, BID  sennosides, 1 tablet, oral, BID      Continuous medications       PRN medications  PRN medications: acetaminophen, oxyCODONE, oxyCODONE, polyethylene glycol    Assessment:  69M with central cord syndrome, s/p C3-5 laminoplasty on 5/18 with Dr." Harika.    Plan:   - Soft collar, no head of bed restrictions, OOB as tolerated  - Perioperative Ancef x48 hours  - DVX ppx: SCDs, ambulation, please hold chemo DVT ppx in setting of spine surgery  - maintain HV drain x1, please monitor and record output q8hrs, 40/20/NR over last 24hrs  - Analgesia per primary, standard ortho post operative pain regimen includes Tylenol 650mg, oxy 5mg/10mg PRN, Dilaudid 0.4mg PRN  - no need for steroids  - OT/PT  - Ortho Spine will continue to follow    Maverick Zhu MD [facility]  Orthopaedic Surgery, PGY-4  p. [phone]  Epic Chat Preferred    This patient will be followed by the Ortho Spine Team. Epic Chat preferred.    First Call: Meir Gramajo, PGY-2  Second Call: Maverick Zhu, PGY-4

## 2024-05-19 NOTE — CARE PLAN
The patient's goals for the shift include      The clinical goals for the shift include Pt will remain HDS this shift    Over the shift, pt remained safe. No complaints of pain or no signs of distress. Safety measures implemented, call light within reach and bed in lowest position

## 2024-05-19 NOTE — NURSING NOTE
1855  New admission arrived to the floor from the unit.  VS stable.  Ext. Purewick applied.  Patient repositioned for comfort.  No c/o pain.

## 2024-05-20 PROCEDURE — 2500000001 HC RX 250 WO HCPCS SELF ADMINISTERED DRUGS (ALT 637 FOR MEDICARE OP)

## 2024-05-20 PROCEDURE — 2500000004 HC RX 250 GENERAL PHARMACY W/ HCPCS (ALT 636 FOR OP/ED): Performed by: PHYSICIAN ASSISTANT

## 2024-05-20 PROCEDURE — 97110 THERAPEUTIC EXERCISES: CPT | Mod: GP

## 2024-05-20 PROCEDURE — 97116 GAIT TRAINING THERAPY: CPT | Mod: GP

## 2024-05-20 PROCEDURE — 1100000001 HC PRIVATE ROOM DAILY

## 2024-05-20 RX ADMIN — DOCUSATE SODIUM 100 MG: 100 CAPSULE, LIQUID FILLED ORAL at 20:27

## 2024-05-20 RX ADMIN — ENOXAPARIN SODIUM 30 MG: 100 INJECTION SUBCUTANEOUS at 20:27

## 2024-05-20 RX ADMIN — SENNOSIDES 8.6 MG: 8.6 TABLET, FILM COATED ORAL at 20:27

## 2024-05-20 RX ADMIN — ENOXAPARIN SODIUM 30 MG: 100 INJECTION SUBCUTANEOUS at 08:13

## 2024-05-20 RX ADMIN — DOCUSATE SODIUM 100 MG: 100 CAPSULE, LIQUID FILLED ORAL at 08:13

## 2024-05-20 RX ADMIN — SENNOSIDES 8.6 MG: 8.6 TABLET, FILM COATED ORAL at 08:13

## 2024-05-20 ASSESSMENT — PAIN - FUNCTIONAL ASSESSMENT
PAIN_FUNCTIONAL_ASSESSMENT: 0-10

## 2024-05-20 ASSESSMENT — COGNITIVE AND FUNCTIONAL STATUS - GENERAL
STANDING UP FROM CHAIR USING ARMS: A LITTLE
CLIMB 3 TO 5 STEPS WITH RAILING: A LITTLE
MOVING TO AND FROM BED TO CHAIR: A LITTLE
TURNING FROM BACK TO SIDE WHILE IN FLAT BAD: A LITTLE
WALKING IN HOSPITAL ROOM: A LITTLE
MOBILITY SCORE: 18
MOVING FROM LYING ON BACK TO SITTING ON SIDE OF FLAT BED WITH BEDRAILS: A LITTLE

## 2024-05-20 ASSESSMENT — PAIN SCALES - GENERAL
PAINLEVEL_OUTOF10: 2
PAINLEVEL_OUTOF10: 0 - NO PAIN
PAINLEVEL_OUTOF10: 0 - NO PAIN

## 2024-05-20 ASSESSMENT — ACTIVITIES OF DAILY LIVING (ADL): LACK_OF_TRANSPORTATION: NO

## 2024-05-20 NOTE — CARE PLAN
The patient's goals for the shift include      The clinical goals for the shift include pt safety    Problem: Fall/Injury  Goal: Not fall by end of shift  Outcome: Progressing     Problem: Fall/Injury  Goal: Be free from injury by end of the shift  Outcome: Progressing     Problem: Skin  Goal: Prevent/minimize sheer/friction injuries  Outcome: Progressing     Problem: Pain - Adult  Goal: Verbalizes/displays adequate comfort level or baseline comfort level  Outcome: Progressing

## 2024-05-20 NOTE — CARE PLAN
The patient's goals for the shift include      The clinical goals for the shift include pt will remain fall free

## 2024-05-20 NOTE — PROGRESS NOTES
[facility]  TRAUMA SERVICE - PROGRESS NOTE    Patient Name: Jimmy Carpenter  MRN: [id number]  Admit Date: 516  : 1955  AGE: 69 y.o.   GENDER: male  ==============================================================================  MECHANISM OF INJURY:   Cocaine-induced from fall from standing    LOC (yes/no?): unclear  Anticoagulant / Anti-platelet Rx? (for what dx?): no  Referring Facility Name (N/A for scene EMR run): n/a    Injuries/problems:  Central cord syndrome with pre-existing stenosis/compression  Mildly displaced L nasal bone fx  Cocaine use d/o    INCIDENTAL FINDINGS:  prox sma filing defect (possible thrombus vs. Chronic dissection flap)     PROCEDURES:  : C3-C5 laminoplasty    ==============================================================================  TODAY'S ASSESSMENT AND PLAN OF CARE:    ## Central cord syndrome, stenosis/compression  - Ortho spine following: soft collar, no HOB/bedrest restrictions, HV drain x1 to stay, to finished 48 hrs periop ancef today, follow up outpt for wound/clinical check Harika  - pt/ot rec spine cord rehab  - pain control with tylenol as need mild pain q6, oxycodone 2.5/5 q6 as needed moderate to severe pain    ## Nasal bone fx  - No in house consult, outpt ENT follow up as needed [phone]    ## Cocaine use d/o  - Refusing thrive resources, says he is no longer going to use    FEN/GI/:  - Benign abd exam  - reg diet  - colace/senna bid, as needed miralax  - voiding well   - RFP pend for today, stop IVF    Ppx:   - SCDs  - Lvx    Dispo: Medically clear, pending placement for spinal cord rehab    Total face to face time spent with patient/family of 30 minutes, with >50% of the time spent discussing plan of care/management, counseling/educating on disease processes, explaining results of diagnostic testing.    Patient discussed with attending, Dr. Mick Ramos, PA-C  Trauma, Critical Care, and Acute Care  Surgery  [id number]  ==============================================================================  CHIEF COMPLAINT / OVERNIGHT EVENTS:   No complaints or overnight events reported.     MEDICAL HISTORY / ROS:  Admission history and ROS reviewed. Pertinent changes as follows:    PHYSICAL EXAM:  Heart Rate:  [72-84]   Temp:  [35.9 °C (96.6 °F)-37.4 °C (99.3 °F)]   Resp:  [16-18]   BP: (114-138)/(70-87)   SpO2:  [96 %-98 %]     Physical Exam:   GEN: No acute distress, patient sitting in chair eating breakfast  SKIN: Warm and dry  ENT: poor dentition, facial abrasion  Neck: soft collar in place, accordian drain serosang  CARDIO: Rate controlled rhythm  RESP: Nml resp rate RA without resp distress  GI: Soft, NT, ND  : deferred  MSK: CHAVEZ, 5/5 pf/df, 5/5 R hand , 4+/5 L hand   EXTREM: No pitting edema  NEURO: Alert and oriented with GCS 15, SILTx4  PSYCH: pleasant    IMAGING SUMMARY:  (summary of new imaging findings, not a copy of dictation)  No new imaging    LABS:  Results from last 7 days   Lab Units 05/18/24 0144 05/17/24 2004 05/17/24 0014 05/16/24  0152   WBC AUTO x10*3/uL 9.2 9.6 5.8 6.4   HEMOGLOBIN g/dL 14.7 14.8 15.4 13.4*   HEMATOCRIT % 44.4 43.5 43.5 38.6*   PLATELETS AUTO x10*3/uL 265 262 205 80*   LYMPHO PCT MAN %  --   --   --  39.5   MONO PCT MAN %  --   --   --  14.0   EOSINO PCT MAN %  --   --   --  0.9     Results from last 7 days   Lab Units 05/18/24 0144 05/17/24 2004 05/16/24  0152   APTT seconds 28 29  --    INR  1.0 1.0 0.9     Results from last 7 days   Lab Units 05/19/24  1219 05/18/24  0144 05/17/24 2004 05/17/24  0014 05/16/24  0152   SODIUM mmol/L 138 135* 138   < > 138   POTASSIUM mmol/L 3.6 3.8 3.9   < > 3.6   CHLORIDE mmol/L 101 101 102   < > 102   CO2 mmol/L 27 25 24   < > 26   BUN mg/dL 8 9 9   < > 12   CREATININE mg/dL 0.84 0.88 0.89   < > 0.97   CALCIUM mg/dL 8.4* 8.2* 8.3*   < > 8.2*   PROTEIN TOTAL g/dL  --   --   --   --  6.9   BILIRUBIN TOTAL mg/dL  --   --   --   --   0.8   ALK PHOS U/L  --   --   --   --  83   ALT U/L  --   --   --   --  11   AST U/L  --   --   --   --  16   GLUCOSE mg/dL 129* 117* 128*   < > 82    < > = values in this interval not displayed.     Results from last 7 days   Lab Units 05/16/24  0152   BILIRUBIN TOTAL mg/dL 0.8     Results from last 7 days   Lab Units 05/18/24  0145 05/17/24 2005   POCT PH, ARTERIAL pH 7.45* 7.34*   POCT PCO2, ARTERIAL mm Hg 39 49*   POCT PO2, ARTERIAL mm Hg 100* 204*   POCT HCO3 CALCULATED, ARTERIAL mmol/L 27.1* 26.4*   POCT BASE EXCESS, ARTERIAL mmol/L 3.0 -0.1       I have reviewed all medications, laboratory results, and imaging pertinent for today's encounter.

## 2024-05-20 NOTE — PROGRESS NOTES
"Orthopaedic Surgery Progress Note    Subjective:  No events overnight. Subjectively feeling well. NAEO.  Pain is controlled as of now.       Objective:    Vitals:  /70 (BP Location: Right arm, Patient Position: Lying)   Pulse 77   Temp 35.9 °C (96.6 °F) (Temporal)   Resp 18   Ht 1.779 m (5' 10.04\")   Wt 63 kg (138 lb 14.2 oz)   SpO2 98%   BMI 19.91 kg/m²     Physical Exam:  - Constitutional: No acute distress  - Eyes: EOM grossly intact  - Head/Neck: Trachea midline  - Respiratory/Thorax: Normal work of breathing  - Cardiovascular: RRR on peripheral palpation  - Gastrointestinal: Nondistended  - Psychological: Appropriate mood/behavior  - Skin: Warm and dry.     - Musculoskeletal:  - soft collar    Spine Exam:  C5: Elbow flexion:  3/5 Left; 4/5 Right  C6: Wrist Ext: 3/5 Left; 3/5 Right  C7: Triceps: 4/5 Left; 4/5 Right  C8: Finger flexion: 4/5 Left; 4/5 Right  T1: Interossei: 3/5 Left; 3/5 Right         Lab Results:  Results from last 7 days   Lab Units 05/18/24  0144 05/17/24 2004 05/17/24  0014   WBC AUTO x10*3/uL 9.2 9.6 5.8   HEMOGLOBIN g/dL 14.7 14.8 15.4   HEMATOCRIT % 44.4 43.5 43.5   PLATELETS AUTO x10*3/uL 265 262 205         Results from last 7 days   Lab Units 05/19/24  1219 05/18/24  0144 05/17/24 2004   SODIUM mmol/L 138 135* 138   POTASSIUM mmol/L 3.6 3.8 3.9   CHLORIDE mmol/L 101 101 102   CO2 mmol/L 27 25 24   BUN mg/dL 8 9 9   CREATININE mg/dL 0.84 0.88 0.89   GLUCOSE mg/dL 129* 117* 128*   CALCIUM mg/dL 8.4* 8.2* 8.3*         Medications:  Scheduled medications  docusate sodium, 100 mg, oral, BID  enoxaparin, 30 mg, subcutaneous, q12h FAWN  sennosides, 1 tablet, oral, BID      Continuous medications       PRN medications  PRN medications: acetaminophen, oxyCODONE, oxyCODONE, polyethylene glycol    Assessment:  69M with central cord syndrome, s/p C3-5 laminoplasty on 5/18 with Dr. Shabazz.    Plan:   - Soft collar, no head of bed restrictions, OOB as tolerated  - Perioperative Ancef x48 " hours. Should complete on 5/20  - DVX ppx: SCDs, ambulation, please hold chemo DVT ppx in setting of spine surgery  - maintain HV drain x1, please monitor and record output q8hrs.  Not recorded overnight on 5/19  - Analgesia per primary, standard ortho post operative pain regimen includes Tylenol 650mg, oxy 5mg/10mg PRN, Dilaudid 0.4mg PRN  - no need for steroids  - OT/PT  - Ortho Spine will continue to follow while drain in place     Williams Leigh DO  Orthopedic Surgery, PGY3    This patient will be followed by the Ortho Spine Team. Epic Chat preferred.    First Call: Niesha Moss, PGY-2  Second Call: Williams Leigh DO, PGY3

## 2024-05-20 NOTE — PROGRESS NOTES
Transitional Care Coordinator Note: Per medical team (trauma) patient is not medically ready. Discharge dispo: Patient evaluated by therapy rec High Intensity. TCC met with patient introduced self and role to complete demographic assessment and discuss discharge planning. Patient A&Ox 2-3. Patient confirmed name,  and emergency contact/POA name, however unable to confirm number or home address. TCC requested to call patients listed emergency contact/POA Lauren [phone] to assist with demographic assessment and discharge planning to which patient agreed. TCC updated bedside nurse on conversation with patient. Call placed to Lauren at listed number, no answer and voicemail box full, TCC unable to leave message or call back number.       Ruben Chapa RN BSN   Transitional Care Coordinator            [phone]   Discharge Planning   Living Arrangements Children   Support Systems Children;Family members   Assistance Needed Patient independent prior to admission   Type of Residence Private residence   Number of Stairs to Enter Residence 4   Number of Stairs Within Residence 12   Do you have animals or pets at home? Yes   Type of Animals or Pets dog   Who is requesting discharge planning? Provider   Home or Post Acute Services Post acute facilities (Rehab/SNF/etc)   Type of Post Acute Facility Services Rehab   Patient expects to be discharged to: [facility] pending choices   Does the patient need discharge transport arranged? Yes   Financial Resource Strain   How hard is it for you to pay for the very basics like food, housing, medical care, and heating? Not hard   Housing Stability   In the last 12 months, was there a time when you were not able to pay the mortgage or rent on time? N   In the last 12 months, how many places have you lived? 1   In the last 12 months, was there a time when you did not have a steady place to sleep or slept in a shelter (including now)? N   Transportation Needs   In the past 12  months, has lack of transportation kept you from medical appointments or from getting medications? no   In the past 12 months, has lack of transportation kept you from meetings, work, or from getting things needed for daily living? No   Patient Choice   Provider Choice list and CMS website (https://medicare.gov/care-compare#search) for post-acute Quality and Resource Measure Data were provided and reviewed with: Patient   Patient / Family choosing to utilize agency / facility established prior to hospitalization No     UPDATE 5/20/2024  16:53     Transitional Care Coordinator Note: TCC met with patient introduced self and role to complete assessment (see above) and discuss discharge planning, as patient more awake and A&O x4 Patient confirmed demographics:    Address: [address] [place] [address]  Alternate/Emergency Contact: Lauren Carpenter ([email or web address]) [phone]  Patient Contact: [phone]   DME: None   Homecare: None   Falls: 1 Falls   PCP: No Active PCP   Pharmacy: [facility]   Insurance: t Medicare     Patient lives with son in two story home. Patient independent prior to admission. Per medical team (trauma) patient is medically ready. Discharge dispo: Patient evaluated by therapy team rec High Intensity.  TCC informed and educated patient on therapy recs. Patient expressed understanding and agreeable to therapy recs. TCC provided AR choices verbally. Patient selected [facility] as FOC stating it is the closes. TCC updated evening SW. TCC received missed call from patient's nisherwin Aguilar, TCC attempted to return call at list number no answer, voicemail full unable to leave message with call back number.     Ruben Chapa RN BSN   Transitional Care Coordinator           Ruben Chapa RN BSN   Transitional Care Coordinator

## 2024-05-20 NOTE — PROGRESS NOTES
It was requested that this LSW submit a referral to [facility] because the patient prefers to receive treatment at this particular facility. At this time, we are waiting for their conclusion regarding the clinicals that this LSW uploaded into [facility].

## 2024-05-20 NOTE — DISCHARGE INSTR - ACTIVITY
-Activity with assistance.  -Progressively walk 2 times a day with a wheeled walker.   -No pushing, pulling, or lifting objects greater than 10 pounds until follow up appointment.  -No heavy house or yard work.  -No showering until sutures are removed from your incision.  -You may not drive until your follow up appointment, or while taking narcotic medication.  -You may not return to work until your follow appointment.  -Wear your cervical neck brace at all times. It may be taken off for dressing changes and hygiene/ showering. Weaning out of your neck brace will be discussed at your postoperative appointment.

## 2024-05-20 NOTE — DISCHARGE INSTR - OTHER ORDERS
Wound Care  Neck (Posterior Cervical Spine) Incision  -Change your dressing daily until there is no drainage from your incision site for 24-48 hours.  The surgical site may be left open to air once drainage has stopped.   -Use an abdominal pad and paper tape for dressing changes  -If there are sutures in your incision, they will be removed 10-14 days postoperatively.        See appointments for details.  -No lotions, creams, or tub soaks.  -May use heat or ice to posterior neck and shoulders as needed for comfort.      **Rehab facilities or home care may remove sutures     No NSAIDS (Advil, Ibuprofen, Alevel, Etc.) for 3 months, they may interfere with the healing of the fusion.

## 2024-05-20 NOTE — DISCHARGE INSTR - AVS FIRST PAGE
-You are breathing faster than normal.  -Fever of 100.4 F or higher.  -Chills  -Urinating less than 4 times per day.  -Acting very sleepy and difficult to awaken.  -Vomiting and not able to eat or drink for 12 hours  -3 or more loose, watery bowel movements in 24 hours (Diarrhea)  -Concerns over the appearance of your incision.    Return immediately to the ER:  -Persistent bilateral arm weakness and or numbness >24 hours.  -Short of breath.  -Unable to swallow.

## 2024-05-20 NOTE — PROGRESS NOTES
Physical Therapy    Physical Therapy Treatment    Patient Name: Jimmy Carpenter  MRN: [id number]  Today's Date: 5/20/2024  Time Calculation  Start Time: 0850  Stop Time: 0917  Time Calculation (min): 27 min    Assessment/Plan   PT Assessment  End of Session Communication: Bedside nurse  Assessment Comment: Pt tolerated PT well without fatigue. Able to AMB 370ft with L LE ER and mild path deviation to the L. No LOB. Completed bed mobility and transfers with dec level of assist this session. Pt continues to benefit from killed PT. Pt remains appropriate for high intensity PT.  End of Session Patient Position: Up in chair, Alarm on  PT Plan  Inpatient/Swing Bed or Outpatient: Inpatient  PT Plan  Treatment/Interventions: Bed mobility, Transfer training, Gait training, Neuromuscular re-education, Strengthening, Therapeutic exercise, Therapeutic activity, Postural re-education  PT Plan: Skilled PT  PT Frequency: Daily  PT Discharge Recommendations: High intensity level of continued care  PT Recommended Transfer Status: Assist x1 (to chair/BSC)  PT - OK to Discharge: Yes (eval completed and discharge recommendations made)      General Visit Information:   PT  Visit  PT Received On: 05/20/24  General  Reason for Referral: fall from standing, unclear mechanical vs. Syncopal. Fell striking face, unknown LOC. Pt. Endorses using cocaine; LUE weakness with severe cervical stenosis and mild cord compression  Mildly displaced L nasal bone fx; s/p Partial C2 Laminectomy, L3-5 Laminoplasty, Spinal Cord Decompression on 5/17. CTH (-) ICH  Past Medical History Relevant to Rehab: denies relevant past medical history  Patient Position Received: Bed, 3 rail up, Alarm off, not on at start of session  General Comment: Pt cleared for PT by RN. Pt alert and agreeable to PT. HV drain, PIVx2, external cath    Subjective   Precautions:  Precautions  Medical Precautions: Fall precautions, Spinal precautions  Post-Surgical Precautions: Spinal  "precautions  Vital Signs:       Objective   Pain:  Pain Assessment  Pain Assessment: 0-10  Pain Score: 2  Pain Location: Arm  Pain Orientation: Left  Pain Interventions: Repositioned, Rest, Ambulation/increased activity  Response to Interventions: AMB decreased pt pain to 1/10  Cognition:  Cognition  Overall Cognitive Status: Within Functional Limits  Orientation Level: Disoriented to time (April, 2024)  Coordination:  Movements are Fluid and Coordinated: Yes (except L UE due to weakness. Utilized R UE for L UE AAROM)  Postural Control:  Postural Control  Postural Control: Within Functional Limits  Static Sitting Balance  Static Sitting-Balance Support: Feet supported, Bilateral upper extremity supported  Static Sitting-Level of Assistance: Close supervision  Dynamic Sitting Balance  Dynamic Sitting-Balance Support: Feet supported, Bilateral upper extremity supported  Dynamic Sitting-Balance: Forward lean  Dynamic Sitting-Comments:  (CGA)  Static Standing Balance  Static Standing-Balance Support: No upper extremity supported  Static Standing-Level of Assistance: Contact guard  Dynamic Standing Balance  Dynamic Standing-Balance Support: No upper extremity supported  Dynamic Standing-Balance: Turning  Dynamic Standing-Comments: CGA    Activity Tolerance:  Activity Tolerance  Endurance: Tolerates 10 - 20 min exercise with multiple rests  Treatments:  Therapeutic Exercise  Therapeutic Exercise Performed: Yes  Therapeutic Exercise Activity 1: ankle pumps, quad sets x3\" hold, heel slides, seated marches, LAQ x3\" hold, all 10x BLE    Therapeutic Activity  Therapeutic Activity Performed: Yes  Therapeutic Activity 1: bed mobility, transfers, pt education on spinal precautions    Bed Mobility  Bed Mobility: Yes  Bed Mobility 1  Bed Mobility 1: Supine to sitting, Log roll, Rolling right  Level of Assistance 1: Contact guard  Bed Mobility Comments 1: log roll to R side    Ambulation/Gait Training  Ambulation/Gait Training " Performed: Yes  Ambulation/Gait Training 1  Surface 1: Level tile  Device 1: No device  Gait Support Devices: Gait belt  Assistance 1: Contact guard  Quality of Gait 1: Narrow base of support, Diminished heel strike, Decreased step length, Foot sweep (L LE remains ER, R foot sweep, mild path deviation)  Comments/Distance (ft) 1: 370ft  Transfers  Transfer: Yes  Transfer 1  Transfer From 1: Sit to, Stand to  Transfer to 1: Sit, Stand  Technique 1: Sit to stand, Stand to sit  Transfer Level of Assistance 1: Contact guard  Trials/Comments 1: x2 throughout session    Outcome Measures:  [facility] Basic Mobility  Turning from your back to your side while in a flat bed without using bedrails: A little  Moving from lying on your back to sitting on the side of a flat bed without using bedrails: A little  Moving to and from bed to chair (including a wheelchair): A little  Standing up from a chair using your arms (e.g. wheelchair or bedside chair): A little  To walk in hospital room: A little  Climbing 3-5 steps with railing: A little  Basic Mobility - Total Score: 18    Education Documentation  Precautions, taught by JESU Brownlee at 5/20/2024  9:51 AM.  Learner: Patient  Readiness: Acceptance  Method: Explanation  Response: Verbalizes Understanding  Comment: Spinal precautiond    Home Exercise Program, taught by JESU Brownlee at 5/20/2024  9:51 AM.  Learner: Patient  Readiness: Acceptance  Method: Explanation  Response: Verbalizes Understanding  Comment: Spinal precautiond    Mobility Training, taught by JESU Brownlee at 5/20/2024  9:51 AM.  Learner: Patient  Readiness: Acceptance  Method: Explanation  Response: Verbalizes Understanding  Comment: Spinal precautiond    Education Comments  No comments found.        Encounter Problems       Encounter Problems (Active)       PT Problem       Patient will complete bed mobility with Cgx1 via log roll sequencing   (Met)       Start:  05/18/24    Expected End:  06/08/24     Resolved:  05/20/24         Patient will complete STS with CGx1 using LRAD without acute LOB   (Met)       Start:  05/18/24    Expected End:  06/08/24    Resolved:  05/20/24         Patient will ambulate >/=75' with LRAD with CGx1 without acute LOB  (Met)       Start:  05/18/24    Expected End:  06/08/24    Resolved:  05/20/24         Patient will complete static (stand by assist) and dynamic (contact guard assist) standing balance activities using LRAD without acute LOB.  (Progressing)       Start:  05/18/24    Expected End:  06/08/24            Patient will participate in BLE there-ex program in order to assist in improving strength and to assist with the completion of functional mobility tasks.  (Progressing)       Start:  05/18/24    Expected End:  06/08/24            Patient will complete bed mobility independently via log roll technique (Progressing)       Start:  05/20/24    Expected End:  06/08/24                Patient will complete sit to stand transfer with close supervision in prep for OOB mobility. (Progressing)       Start:  05/20/24    Expected End:  06/08/24                Patient will ambulate > 250ft with LRAD and supervision in prep for safe discharge home. (Progressing)       Start:  05/20/24    Expected End:  06/08/24

## 2024-05-21 LAB
ALBUMIN SERPL BCP-MCNC: 3.5 G/DL (ref 3.4–5)
ANION GAP SERPL CALC-SCNC: 12 MMOL/L (ref 10–20)
BUN SERPL-MCNC: 14 MG/DL (ref 6–23)
CALCIUM SERPL-MCNC: 8.7 MG/DL (ref 8.6–10.6)
CHLORIDE SERPL-SCNC: 101 MMOL/L (ref 98–107)
CO2 SERPL-SCNC: 29 MMOL/L (ref 21–32)
CREAT SERPL-MCNC: 0.82 MG/DL (ref 0.5–1.3)
EGFRCR SERPLBLD CKD-EPI 2021: >90 ML/MIN/1.73M*2
GLUCOSE SERPL-MCNC: 82 MG/DL (ref 74–99)
MAGNESIUM SERPL-MCNC: 1.98 MG/DL (ref 1.6–2.4)
PHOSPHATE SERPL-MCNC: 2.7 MG/DL (ref 2.5–4.9)
POTASSIUM SERPL-SCNC: 4 MMOL/L (ref 3.5–5.3)
SODIUM SERPL-SCNC: 138 MMOL/L (ref 136–145)

## 2024-05-21 PROCEDURE — 2500000004 HC RX 250 GENERAL PHARMACY W/ HCPCS (ALT 636 FOR OP/ED): Performed by: PHYSICIAN ASSISTANT

## 2024-05-21 PROCEDURE — 1100000001 HC PRIVATE ROOM DAILY

## 2024-05-21 PROCEDURE — 84100 ASSAY OF PHOSPHORUS: CPT | Performed by: NURSE PRACTITIONER

## 2024-05-21 PROCEDURE — 36415 COLL VENOUS BLD VENIPUNCTURE: CPT | Performed by: NURSE PRACTITIONER

## 2024-05-21 PROCEDURE — 83735 ASSAY OF MAGNESIUM: CPT | Performed by: NURSE PRACTITIONER

## 2024-05-21 PROCEDURE — 97530 THERAPEUTIC ACTIVITIES: CPT | Mod: GP

## 2024-05-21 PROCEDURE — 2500000001 HC RX 250 WO HCPCS SELF ADMINISTERED DRUGS (ALT 637 FOR MEDICARE OP)

## 2024-05-21 RX ADMIN — ENOXAPARIN SODIUM 30 MG: 100 INJECTION SUBCUTANEOUS at 20:25

## 2024-05-21 RX ADMIN — SENNOSIDES 8.6 MG: 8.6 TABLET, FILM COATED ORAL at 08:21

## 2024-05-21 RX ADMIN — SENNOSIDES 8.6 MG: 8.6 TABLET, FILM COATED ORAL at 20:25

## 2024-05-21 RX ADMIN — ENOXAPARIN SODIUM 30 MG: 100 INJECTION SUBCUTANEOUS at 08:21

## 2024-05-21 RX ADMIN — DOCUSATE SODIUM 100 MG: 100 CAPSULE, LIQUID FILLED ORAL at 20:25

## 2024-05-21 RX ADMIN — DOCUSATE SODIUM 100 MG: 100 CAPSULE, LIQUID FILLED ORAL at 08:21

## 2024-05-21 ASSESSMENT — COGNITIVE AND FUNCTIONAL STATUS - GENERAL
MOVING FROM LYING ON BACK TO SITTING ON SIDE OF FLAT BED WITH BEDRAILS: A LITTLE
CLIMB 3 TO 5 STEPS WITH RAILING: A LITTLE
TURNING FROM BACK TO SIDE WHILE IN FLAT BAD: A LITTLE
STANDING UP FROM CHAIR USING ARMS: A LITTLE
WALKING IN HOSPITAL ROOM: A LITTLE
MOVING TO AND FROM BED TO CHAIR: A LITTLE
MOBILITY SCORE: 18

## 2024-05-21 ASSESSMENT — PAIN - FUNCTIONAL ASSESSMENT
PAIN_FUNCTIONAL_ASSESSMENT: 0-10
PAIN_FUNCTIONAL_ASSESSMENT: 0-10

## 2024-05-21 ASSESSMENT — PAIN SCALES - GENERAL
PAINLEVEL_OUTOF10: 0 - NO PAIN

## 2024-05-21 NOTE — CARE PLAN
Patient's cervical drained was removed at bedside with no complications.  4x4 with tegaderms covered over the incision and the drain site.   Ok to start DVT ppx if indicated from the primary team from an orthopedic standpoint.     We will follow peripherally while pt is in house. Patient currently has soft dressing on surgical site. Dressing should be removed on POD7. Patient should follow up w/ Dr. Shabazz 1-2 weeks after surgery for post-operative appointment (pt may call [phone] to schedule). Please page with questions.      Discussed with Dr. Harika Leigh, DO  Orthopedic Surgery, PGY3

## 2024-05-21 NOTE — CARE PLAN
The patient's goals for the shift include      The clinical goals for the shift include pt to remain free of falls

## 2024-05-21 NOTE — PROGRESS NOTES
[facility]  TRAUMA SERVICE - PROGRESS NOTE    Patient Name: Jimmy Carpenter  MRN: [id number]  Admit Date: 516  : 1955  AGE: 69 y.o.   GENDER: male  ==============================================================================  MECHANISM OF INJURY:   Cocaine-induced from fall from standing    LOC (yes/no?): unclear  Anticoagulant / Anti-platelet Rx? (for what dx?): no  Referring Facility Name (N/A for scene EMR run): n/a    Injuries/problems:  Central cord syndrome with pre-existing stenosis/compression  Mildly displaced L nasal bone fx  Cocaine use d/o    INCIDENTAL FINDINGS:  prox sma filing defect (possible thrombus vs. Chronic dissection flap)     PROCEDURES:  : C3-C5 laminoplasty    ==============================================================================  TODAY'S ASSESSMENT AND PLAN OF CARE:    ## Central cord syndrome, stenosis/compression  - Ortho spine following: soft collar, no HOB/bedrest restrictions, HV drain x1 to stay, to finished 48 hrs periop ancef today, follow up outpt for wound/clinical check Harika  - pt/ot rec spine cord rehab  - pain control with tylenol as need mild pain q6, oxycodone 2.5/5 q6 as needed moderate to severe pain    ## Nasal bone fx  - No in house consult, outpt ENT follow up as needed [phone]    ## Cocaine use d/o  - Refusing thrive resources, says he is no longer going to use    FEN/GI/:  - Benign abd exam  - reg diet  - colace/senna bid, as needed miralax  - voiding well   - RFP pend for today, stop IVF    Ppx:   - SCDs  - Lvx    Dispo: Medically clear, pending placement for spinal cord re[facility] at [facility].    Pt discussed with Dr. Barton,    Boyd Jacobo, CNP  Trauma Surgery  Floor: [id number] TICU: [id number]  Pager: [phone]    Total face to face time spent with patient of 30 minutes, with >50% of the time spent discussing plan of care/management, counseling/educating on disease processes, explaining results of diagnostic  testing.    =======================================================================  CHIEF COMPLAINT / OVERNIGHT EVENTS:   Pt doing well overnight, no complaints of pain, eating appropriately and having BM without issue.    MEDICAL HISTORY / ROS:  Admission history and ROS reviewed. Pertinent changes as follows:    PHYSICAL EXAM:  Heart Rate:  [62-85]   Temp:  [36.2 °C (97.2 °F)-37.2 °C (99 °F)]   Resp:  [16-18]   BP: (111-128)/(74-83)   SpO2:  [96 %-99 %]     Physical Exam:   GEN: No acute distress, patient sitting in chair eating breakfast  SKIN: Warm and dry  ENT: poor dentition, facial abrasion  Neck: soft collar in place, accordian drain serosang  CARDIO: Rate controlled rhythm  RESP: Nml resp rate RA without resp distress  GI: Soft, NT, ND  : deferred  MSK: CHAVEZ, 5/5 pf/df, 5/5 R hand , 4+/5 L hand   EXTREM: No pitting edema  NEURO: Alert and oriented with GCS 15, SILTx4  PSYCH: pleasant    IMAGING SUMMARY:  (summary of new imaging findings, not a copy of dictation)  No new imaging    LABS:  Results from last 7 days   Lab Units 05/18/24 0144 05/17/24 2004 05/17/24 0014 05/16/24  0152   WBC AUTO x10*3/uL 9.2 9.6 5.8 6.4   HEMOGLOBIN g/dL 14.7 14.8 15.4 13.4*   HEMATOCRIT % 44.4 43.5 43.5 38.6*   PLATELETS AUTO x10*3/uL 265 262 205 80*   LYMPHO PCT MAN %  --   --   --  39.5   MONO PCT MAN %  --   --   --  14.0   EOSINO PCT MAN %  --   --   --  0.9     Results from last 7 days   Lab Units 05/18/24 0144 05/17/24 2004 05/16/24  0152   APTT seconds 28 29  --    INR  1.0 1.0 0.9     Results from last 7 days   Lab Units 05/21/24  0836 05/19/24  1219 05/18/24  0144 05/17/24  0014 05/16/24  0152   SODIUM mmol/L 138 138 135*   < > 138   POTASSIUM mmol/L 4.0 3.6 3.8   < > 3.6   CHLORIDE mmol/L 101 101 101   < > 102   CO2 mmol/L 29 27 25   < > 26   BUN mg/dL 14 8 9   < > 12   CREATININE mg/dL 0.82 0.84 0.88   < > 0.97   CALCIUM mg/dL 8.7 8.4* 8.2*   < > 8.2*   PROTEIN TOTAL g/dL  --   --   --   --  6.9    BILIRUBIN TOTAL mg/dL  --   --   --   --  0.8   ALK PHOS U/L  --   --   --   --  83   ALT U/L  --   --   --   --  11   AST U/L  --   --   --   --  16   GLUCOSE mg/dL 82 129* 117*   < > 82    < > = values in this interval not displayed.     Results from last 7 days   Lab Units 05/16/24  0152   BILIRUBIN TOTAL mg/dL 0.8     Results from last 7 days   Lab Units 05/18/24  0145 05/17/24 2005   POCT PH, ARTERIAL pH 7.45* 7.34*   POCT PCO2, ARTERIAL mm Hg 39 49*   POCT PO2, ARTERIAL mm Hg 100* 204*   POCT HCO3 CALCULATED, ARTERIAL mmol/L 27.1* 26.4*   POCT BASE EXCESS, ARTERIAL mmol/L 3.0 -0.1       I have reviewed all medications, laboratory results, and imaging pertinent for today's encounter.

## 2024-05-21 NOTE — PROGRESS NOTES
"SW met with patient at bedside to make him aware that he was [facility] for [facility]. Patient is agreeable to referral being sent to [facility]. Patient is medically ready for discharge. SW will continue to follow to facilitate discharge plan.     Update @ 14:02: Patient was declined by [facility], as he is \"walking too far\". Patient will likely need to discharge home with home care.       Kenzie Summers LCSW      "

## 2024-05-21 NOTE — PROGRESS NOTES
"Orthopaedic Surgery Progress Note    Subjective:  No events overnight. Denies N/T down the lower extremities.       Objective:    Vitals:  /79 (BP Location: Right arm, Patient Position: Lying)   Pulse 71   Temp 36.2 °C (97.2 °F) (Temporal)   Resp 18   Ht 1.779 m (5' 10.04\")   Wt 63 kg (138 lb 14.2 oz)   SpO2 96%   BMI 19.91 kg/m²     Physical Exam:  - Constitutional: No acute distress  - Eyes: EOM grossly intact  - Head/Neck: Trachea midline  - Respiratory/Thorax: Normal work of breathing  - Cardiovascular: RRR on peripheral palpation  - Gastrointestinal: Nondistended  - Psychological: Appropriate mood/behavior  - Skin: Warm and dry.     - Musculoskeletal:  - soft collar    Spine Exam:  C5: Elbow flexion:  3/5 Left; 4/5 Right  C6: Wrist Ext: 3/5 Left; 3/5 Right  C7: Triceps: 4/5 Left; 4/5 Right  C8: Finger flexion: 4/5 Left; 4/5 Right  T1: Interossei: 3/5 Left; 3/5 Right         Lab Results:  Results from last 7 days   Lab Units 05/18/24  0144 05/17/24 2004 05/17/24  0014   WBC AUTO x10*3/uL 9.2 9.6 5.8   HEMOGLOBIN g/dL 14.7 14.8 15.4   HEMATOCRIT % 44.4 43.5 43.5   PLATELETS AUTO x10*3/uL 265 262 205         Results from last 7 days   Lab Units 05/19/24  1219 05/18/24  0144 05/17/24 2004   SODIUM mmol/L 138 135* 138   POTASSIUM mmol/L 3.6 3.8 3.9   CHLORIDE mmol/L 101 101 102   CO2 mmol/L 27 25 24   BUN mg/dL 8 9 9   CREATININE mg/dL 0.84 0.88 0.89   GLUCOSE mg/dL 129* 117* 128*   CALCIUM mg/dL 8.4* 8.2* 8.3*         Medications:  Scheduled medications  docusate sodium, 100 mg, oral, BID  enoxaparin, 30 mg, subcutaneous, q12h FAWN  sennosides, 1 tablet, oral, BID      Continuous medications       PRN medications  PRN medications: acetaminophen, oxyCODONE, oxyCODONE, polyethylene glycol    Assessment:  69M with central cord syndrome, s/p C3-5 laminoplasty on 5/18 with Dr. Shabazz.    Plan:   - Soft collar, no head of bed restrictions, OOB as tolerated  - Perioperative Ancef x48. Completed   - DVX ppx: " SCDs, ambulation, please hold chemo DVT ppx in setting of spine surgery  - maintain HV drain x1, please monitor and record output q8hrs.  Total of 70ml past two shifts  - Analgesia per primary, standard ortho post operative pain regimen includes Tylenol 650mg, oxy 5mg/10mg PRN, Dilaudid 0.4mg PRN  - no need for steroids  - OT/PT  - Ortho Spine will continue to follow while drain in place     Williams Leigh DO  Orthopedic Surgery, PGY3    This patient will be followed by the Ortho Spine Team. Epic Chat preferred.    First Call: Niesha Moss, PGY-2  Second Call: Williams Leigh DO, PGY3

## 2024-05-21 NOTE — PROGRESS NOTES
"Transitional Care Coordinator Note: Per medical team (trauma) patient is medically ready. Discharge dispo: Pending updated therapy notes and recs. Patient denied [facility] ([facility]) and  [facility] stating patient is \"walking to far\". TCC placed requested in TCC/Therapy column for updated note needed for re-evaluation for safety of discharging home with HC and needed DME.       Ruben Chapa RN BSN   Transitional Care Coordinator   "

## 2024-05-21 NOTE — PROGRESS NOTES
Physical Therapy    Physical Therapy Treatment    Patient Name: Jimmy Carpenter  MRN: [id number]  Today's Date: 5/21/2024  Time Calculation  Start Time: 1324  Stop Time: 1345  Time Calculation (min): 21 min    Assessment/Plan   PT Assessment  End of Session Communication: Bedside nurse  Assessment Comment: Pt tolerated PT well without fatigue or pain. Pt was able to AMB 370ft close supervision with slight path deviation to the R. Pt reports feelins slightly unbalanced during AMB but was not limited by fatigue and no LOB. Pt completed bed mobility and transfers with dec level of assistance (close supervision). Pt scored as a low fall risk on the Tinetti Balance Test. Pt continues to benefit from skilled pt. Pt remains appropriate for high intensity PT but may progress to low pending AMB and balance.  End of Session Patient Position: Alarm off, not on at start of session, Bed, 3 rail up  PT Plan  Inpatient/Swing Bed or Outpatient: Inpatient  PT Plan  Treatment/Interventions: Bed mobility, Transfer training, Gait training, Neuromuscular re-education, Strengthening, Therapeutic exercise, Therapeutic activity, Postural re-education  PT Plan: Skilled PT  PT Frequency: Daily  PT Discharge Recommendations: High intensity level of continued care  PT Recommended Transfer Status: Stand by assist  PT - OK to Discharge: Yes (eval complete and discharge recommendations made)      General Visit Information:   PT  Visit  PT Received On: 05/21/24  General  Reason for Referral: fall from standing, unclear mechanical vs. Syncopal. Fell striking face, unknown LOC. Pt. Endorses using cocaine; LUE weakness with severe cervical stenosis and mild cord compression  Mildly displaced L nasal bone fx; s/p Partial C2 Laminectomy, L3-5 Laminoplasty, Spinal Cord Decompression on 5/17. CTH (-) ICH  Past Medical History Relevant to Rehab: denies relevant past medical history  Patient Position Received: Bed, 3 rail up, Alarm off, not on at start of  session  General Comment: Pt cleared for PT by RN. Pt alert and agreeable to PT. PIVx2, external cath    Subjective   Precautions:  Precautions  Medical Precautions: Fall precautions, Spinal precautions  Precautions Comment: log roll for bed mobility  Vital Signs:       Objective   Pain:  Pain Assessment  Pain Assessment: 0-10  Pain Score: 0 - No pain  Cognition:  Cognition  Overall Cognitive Status: Within Functional Limits  Orientation Level: Oriented X4  Coordination:  Movements are Fluid and Coordinated: Yes  Postural Control:  Postural Control  Postural Control: Within Functional Limits  Static Sitting Balance  Static Sitting-Balance Support: Feet supported, No upper extremity supported  Static Sitting-Level of Assistance: Close supervision  Dynamic Sitting Balance  Dynamic Sitting-Balance Support: Feet supported, No upper extremity supported  Dynamic Sitting-Balance: Forward lean  Dynamic Sitting-Comments: Close supervision  Static Standing Balance  Static Standing-Balance Support: No upper extremity supported  Static Standing-Level of Assistance: Close supervision  Dynamic Standing Balance  Dynamic Standing-Balance Support: No upper extremity supported  Dynamic Standing-Balance: Turning, Tandem stance  Dynamic Standing-Comments: Close supervision    Activity Tolerance:  Activity Tolerance  Endurance: Tolerates 10 - 20 min exercise with multiple rests  Treatments:  Therapeutic Activity  Therapeutic Activity Performed: Yes  Therapeutic Activity 1: bed mobility, transfers, pt education on spinal precautions, standing balance    Balance/Neuromuscular Re-Education  Balance/Neuromuscular Re-Education Activity Performed: Yes  Balance/Neuromuscular Re-Education Activity 1: tandem stance B LE anterior, eyes open, maintained for 30 sec, close supervision  Balance/Neuromuscular Re-Education Activity 2: rhomberg stance, eyes open, 30 sec, close supervision    Bed Mobility  Bed Mobility: Yes  Bed Mobility 1  Bed Mobility  1: Supine to sitting, Log roll, Rolling right  Level of Assistance 1: Close supervision    Ambulation/Gait Training  Ambulation/Gait Training Performed: Yes  Ambulation/Gait Training 1  Surface 1: Level tile  Device 1: No device  Gait Support Devices: Gait belt  Assistance 1: Close supervision  Quality of Gait 1: Narrow base of support, Diminished heel strike, Decreased step length  Comments/Distance (ft) 1: 370ft (mild path deviation mainly to R w/ self correction)  Transfers  Transfer: Yes  Transfer 1  Transfer From 1: Sit to, Stand to  Transfer to 1: Sit, Stand  Technique 1: Sit to stand, Stand to sit  Transfer Level of Assistance 1: Close supervision    Outcome Measures:  [facility] Basic Mobility  Turning from your back to your side while in a flat bed without using bedrails: A little  Moving from lying on your back to sitting on the side of a flat bed without using bedrails: A little  Moving to and from bed to chair (including a wheelchair): A little  Standing up from a chair using your arms (e.g. wheelchair or bedside chair): A little  To walk in hospital room: A little  Climbing 3-5 steps with railing: A little  Basic Mobility - Total Score: 18    Tinetti  Sitting Balance: Steady, safe  Arises: Able, uses arms to help  Attempts to Arise: Able to arise, one attempt  Immediate Standing Balance (First 5 Seconds): Steady without walker or other support  Standing Balance: Narrow stance without support  Nudged: Steady without walker or other support  Eyes Closed: Steady  Turned 360 Degrees: Steadiness: Steady  Turned 360 Degrees: Continuity of Steps: Continuous  Sitting Down: Safe, smooth motion  Balance Score: 15  Initiation of Gait: No hesitancy  Step Height: R Swing Foot: Right foot complete clears floor  Step Length: R Swing Foot: Passes left stance foot  Step Height: L Swing Foot: Left foot complete clears floor  Step Length: L Swing Foot: Passes right stance foot  Step Symmetry: Right and left step appear  equal  Step Continuity: Steps appear continuous  Path: Mild/moderate deviation or uses walking aid  Trunk: No sway, no flexion, no use of arms, no walking aid  Walking Time: Heels almost touching while walking  Gait Score: 11  Total Score: 26    Education Documentation  Precautions, taught by LASHANDA BrownleePT at 5/21/2024  2:53 PM.  Learner: Patient  Readiness: Acceptance  Method: Explanation  Response: Verbalizes Understanding  Comment: spinal precautions    Mobility Training, taught by LASHANDA BrownleePT at 5/21/2024  2:53 PM.  Learner: Patient  Readiness: Acceptance  Method: Explanation  Response: Verbalizes Understanding  Comment: spinal precautions    Education Comments  No comments found.      Encounter Problems       Encounter Problems (Active)       PT Problem       Patient will complete bed mobility with Cgx1 via log roll sequencing   (Met)       Start:  05/18/24    Expected End:  06/08/24    Resolved:  05/20/24         Patient will complete STS with CGx1 using LRAD without acute LOB   (Met)       Start:  05/18/24    Expected End:  06/08/24    Resolved:  05/20/24         Patient will ambulate >/=75' with LRAD with CGx1 without acute LOB  (Met)       Start:  05/18/24    Expected End:  06/08/24    Resolved:  05/20/24         Patient will complete static (stand by assist) and dynamic (contact guard assist) standing balance activities using LRAD without acute LOB.  (Progressing)       Start:  05/18/24    Expected End:  06/08/24            Patient will participate in BLE there-ex program in order to assist in improving strength and to assist with the completion of functional mobility tasks.  (Progressing)       Start:  05/18/24    Expected End:  06/08/24            Patient will complete bed mobility independently via log roll technique (Progressing)       Start:  05/20/24    Expected End:  06/08/24                Patient will complete sit to stand transfer with close supervision in prep for OOB mobility. (Met)        Start:  05/20/24    Expected End:  06/08/24    Resolved:  05/21/24             Patient will ambulate > 250ft with LRAD and supervision in prep for safe discharge home. (Progressing)       Start:  05/20/24    Expected End:  06/08/24             Goal Note       W/o path deviation or LOB

## 2024-05-22 PROCEDURE — 1100000001 HC PRIVATE ROOM DAILY

## 2024-05-22 PROCEDURE — 2500000001 HC RX 250 WO HCPCS SELF ADMINISTERED DRUGS (ALT 637 FOR MEDICARE OP)

## 2024-05-22 PROCEDURE — 2500000004 HC RX 250 GENERAL PHARMACY W/ HCPCS (ALT 636 FOR OP/ED): Performed by: PHYSICIAN ASSISTANT

## 2024-05-22 PROCEDURE — 97535 SELF CARE MNGMENT TRAINING: CPT | Mod: GO

## 2024-05-22 PROCEDURE — 97116 GAIT TRAINING THERAPY: CPT | Mod: GP

## 2024-05-22 PROCEDURE — 2500000004 HC RX 250 GENERAL PHARMACY W/ HCPCS (ALT 636 FOR OP/ED)

## 2024-05-22 RX ORDER — POLYETHYLENE GLYCOL 3350 17 G/17G
17 POWDER, FOR SOLUTION ORAL DAILY
Status: DISCONTINUED | OUTPATIENT
Start: 2024-05-22 | End: 2024-05-27 | Stop reason: HOSPADM

## 2024-05-22 RX ADMIN — SENNOSIDES 8.6 MG: 8.6 TABLET, FILM COATED ORAL at 08:00

## 2024-05-22 RX ADMIN — DOCUSATE SODIUM 100 MG: 100 CAPSULE, LIQUID FILLED ORAL at 08:00

## 2024-05-22 RX ADMIN — DOCUSATE SODIUM 100 MG: 100 CAPSULE, LIQUID FILLED ORAL at 20:32

## 2024-05-22 RX ADMIN — ENOXAPARIN SODIUM 30 MG: 100 INJECTION SUBCUTANEOUS at 08:00

## 2024-05-22 RX ADMIN — POLYETHYLENE GLYCOL 3350 17 G: 17 POWDER, FOR SOLUTION ORAL at 08:00

## 2024-05-22 RX ADMIN — SENNOSIDES 8.6 MG: 8.6 TABLET, FILM COATED ORAL at 20:32

## 2024-05-22 RX ADMIN — ENOXAPARIN SODIUM 30 MG: 100 INJECTION SUBCUTANEOUS at 20:32

## 2024-05-22 ASSESSMENT — ACTIVITIES OF DAILY LIVING (ADL)
HOME_MANAGEMENT_TIME_ENTRY: 12
HOME_MANAGEMENT_TIME_ENTRY: 12

## 2024-05-22 ASSESSMENT — PAIN - FUNCTIONAL ASSESSMENT
PAIN_FUNCTIONAL_ASSESSMENT: 0-10

## 2024-05-22 ASSESSMENT — COGNITIVE AND FUNCTIONAL STATUS - GENERAL
PERSONAL GROOMING: A LITTLE
MOVING FROM LYING ON BACK TO SITTING ON SIDE OF FLAT BED WITH BEDRAILS: A LITTLE
TOILETING: A LITTLE
WALKING IN HOSPITAL ROOM: A LITTLE
STANDING UP FROM CHAIR USING ARMS: A LITTLE
HELP NEEDED FOR BATHING: A LITTLE
MOVING TO AND FROM BED TO CHAIR: A LITTLE
MOBILITY SCORE: 18
CLIMB 3 TO 5 STEPS WITH RAILING: A LITTLE
DRESSING REGULAR UPPER BODY CLOTHING: A LITTLE
DRESSING REGULAR LOWER BODY CLOTHING: A LITTLE
DAILY ACTIVITIY SCORE: 19
TURNING FROM BACK TO SIDE WHILE IN FLAT BAD: A LITTLE

## 2024-05-22 ASSESSMENT — PAIN SCALES - GENERAL
PAINLEVEL_OUTOF10: 0 - NO PAIN

## 2024-05-22 NOTE — PROGRESS NOTES
[facility]  TRAUMA SERVICE - PROGRESS NOTE    Patient Name: Jimmy Carpenter  MRN: [id number]  Admit Date: 516  : 1955  AGE: 69 y.o.   GENDER: male  ==============================================================================  MECHANISM OF INJURY:   Cocaine-induced from fall from standing    LOC (yes/no?): unclear  Anticoagulant / Anti-platelet Rx? (for what dx?): no  Referring Facility Name (N/A for scene EMR run): n/a    Injuries/problems:  Central cord syndrome with pre-existing stenosis/compression  Mildly displaced L nasal bone fx  Cocaine use d/o    INCIDENTAL FINDINGS:  prox sma filing defect (possible thrombus vs. Chronic dissection flap)     PROCEDURES:  : C3-C5 laminoplasty    ==============================================================================  TODAY'S ASSESSMENT AND PLAN OF CARE:    ## Central cord syndrome, stenosis/compression  - Ortho spine following: soft collar, no HOB/bedrest restrictions, HV drain x1 to stay, to finished 48 hrs periop ancef today, follow up outpt for wound/clinical check Harika  - pt/ot rec spine cord rehab  - pain control with tylenol as need mild pain q6, oxycodone 2.5/5 q6 as needed moderate to severe pain    ## Nasal bone fx  - No in house consult, outpt ENT follow up as needed [phone]    ## Cocaine use d/o  - Refusing thrive resources, says he is no longer going to use    FEN/GI/:  - Benign abd exam  - reg diet  - colace/senna bid, as needed miralax  - voiding well     Ppx:   - SCDs  - Lvx    Dispo: Medically clear for discharge, pt denied from acute rehab now PT/OT recommending HHC. Will reengage PT/OT due to safety concern regarding discharge home.    Pt discussed with Dr. Barton,    Boyd Jacobo, CNP  Trauma Surgery  Floor: [id number] TICU: [id number]  Pager: [phone]    Total face to face time spent with patient of 25 minutes, with >50% of the time spent discussing plan of care/management, counseling/educating on  disease processes, explaining results of diagnostic testing.    =======================================================================  CHIEF COMPLAINT / OVERNIGHT EVENTS:   Pt doing well overnight, no complaints of pain, eating appropriately and having BM without issue.    MEDICAL HISTORY / ROS:  Admission history and ROS reviewed. Pertinent changes as follows:    PHYSICAL EXAM:  Heart Rate:  [73-85]   Temp:  [36.2 °C (97.2 °F)-36.7 °C (98.1 °F)]   Resp:  [16-18]   BP: (110-147)/(72-90)   SpO2:  [97 %-99 %]     Physical Exam:   GEN: No acute distress, patient sitting in chair eating breakfast  SKIN: Warm and dry  ENT: poor dentition, facial abrasion  Neck: soft collar in place, accordian drain serosang  CARDIO: Rate controlled rhythm  RESP: Nml resp rate RA without resp distress  GI: Soft, NT, ND  : deferred  MSK: CHAVEZ, 5/5 pf/df, 5/5 R hand , 4+/5 L hand   EXTREM: No pitting edema  NEURO: Alert and oriented with GCS 15, SILTx4  PSYCH: pleasant    IMAGING SUMMARY:  (summary of new imaging findings, not a copy of dictation)  No new imaging    LABS:  Results from last 7 days   Lab Units 05/18/24 0144 05/17/24 2004 05/17/24 0014 05/16/24 0152   WBC AUTO x10*3/uL 9.2 9.6 5.8 6.4   HEMOGLOBIN g/dL 14.7 14.8 15.4 13.4*   HEMATOCRIT % 44.4 43.5 43.5 38.6*   PLATELETS AUTO x10*3/uL 265 262 205 80*   LYMPHO PCT MAN %  --   --   --  39.5   MONO PCT MAN %  --   --   --  14.0   EOSINO PCT MAN %  --   --   --  0.9     Results from last 7 days   Lab Units 05/18/24 0144 05/17/24 2004 05/16/24 0152   APTT seconds 28 29  --    INR  1.0 1.0 0.9     Results from last 7 days   Lab Units 05/21/24  0836 05/19/24  1219 05/18/24  0144 05/17/24  0014 05/16/24  0152   SODIUM mmol/L 138 138 135*   < > 138   POTASSIUM mmol/L 4.0 3.6 3.8   < > 3.6   CHLORIDE mmol/L 101 101 101   < > 102   CO2 mmol/L 29 27 25   < > 26   BUN mg/dL 14 8 9   < > 12   CREATININE mg/dL 0.82 0.84 0.88   < > 0.97   CALCIUM mg/dL 8.7 8.4* 8.2*   < >  8.2*   PROTEIN TOTAL g/dL  --   --   --   --  6.9   BILIRUBIN TOTAL mg/dL  --   --   --   --  0.8   ALK PHOS U/L  --   --   --   --  83   ALT U/L  --   --   --   --  11   AST U/L  --   --   --   --  16   GLUCOSE mg/dL 82 129* 117*   < > 82    < > = values in this interval not displayed.     Results from last 7 days   Lab Units 05/16/24  0152   BILIRUBIN TOTAL mg/dL 0.8     Results from last 7 days   Lab Units 05/18/24  0145 05/17/24 2005   POCT PH, ARTERIAL pH 7.45* 7.34*   POCT PCO2, ARTERIAL mm Hg 39 49*   POCT PO2, ARTERIAL mm Hg 100* 204*   POCT HCO3 CALCULATED, ARTERIAL mmol/L 27.1* 26.4*   POCT BASE EXCESS, ARTERIAL mmol/L 3.0 -0.1       I have reviewed all medications, laboratory results, and imaging pertinent for today's encounter.

## 2024-05-22 NOTE — PROGRESS NOTES
Physical Therapy    Physical Therapy Treatment    Patient Name: Jimmy Carpenter  MRN: [id number]  Today's Date: 5/22/2024  Time Calculation  Start Time: 0837  Stop Time: 0855  Time Calculation (min): 18 min    Assessment/Plan   PT Assessment  End of Session Communication: Bedside nurse  Assessment Comment: Pt tolerated PT well without fatigue or pain. Pt was able to AMB 420ft with close supervision and RW with improved balance and dec path deviation. Pt was able to a/descend 6 steps with R rail and gaitbelt with close supervision. pt completed bed mobility with dec level of assist. Pt scores as a low fall risk on the Tinetti Balance test. Pt continues to benefit from skilled PT. At this time, patient has made good progress with PT and will benefit from low intensity PT.  End of Session Patient Position: Up in chair, Alarm off, caregiver present (OT in room)  PT Plan  Inpatient/Swing Bed or Outpatient: Inpatient  PT Plan  Treatment/Interventions: Bed mobility, Transfer training, Gait training, Neuromuscular re-education, Strengthening, Therapeutic exercise, Therapeutic activity, Postural re-education  PT Plan: Skilled PT  PT Frequency: Daily  PT Discharge Recommendations: Low intensity level of continued care  Equipment Recommended upon Discharge: Wheeled walker  PT Recommended Transfer Status: Assistive device, Stand by assist  PT - OK to Discharge:  (eval complete and discharge recommendations made)      General Visit Information:   PT  Visit  PT Received On: 05/22/24  General  Reason for Referral: fall from standing, unclear mechanical vs. Syncopal. Fell striking face, unknown LOC. Pt. Endorses using cocaine; LUE weakness with severe cervical stenosis and mild cord compression  Mildly displaced L nasal bone fx; s/p Partial C2 Laminectomy, L3-5 Laminoplasty, Spinal Cord Decompression on 5/17. CTH (-) ICH  Past Medical History Relevant to Rehab: denies relevant past medical history  Prior to Session Communication:  Bedside nurse  Patient Position Received: Bed, 3 rail up, Alarm on  General Comment: Pt cleared for PT by RN. Pt alert and agreeable to PT. PIVx2    Subjective   Precautions:  Precautions  Medical Precautions: Fall precautions, Spinal precautions  Post-Surgical Precautions: Spinal precautions  Precautions Comment: log roll for bed mobility    Objective   Pain:  Pain Assessment  Pain Assessment: 0-10  Pain Score: 0 - No pain  Cognition:  Cognition  Overall Cognitive Status: Within Functional Limits  Orientation Level: Oriented X4  Coordination:  Movements are Fluid and Coordinated: Yes  Postural Control:  Postural Control  Postural Control: Within Functional Limits  Static Sitting Balance  Static Sitting-Balance Support: Feet supported, No upper extremity supported  Static Sitting-Level of Assistance: Independent  Dynamic Sitting Balance  Dynamic Sitting-Balance Support: Feet supported, No upper extremity supported  Dynamic Sitting-Balance: Forward lean  Dynamic Sitting-Comments: Independent  Static Standing Balance  Static Standing-Balance Support: Bilateral upper extremity supported (RW)  Static Standing-Level of Assistance: Modified independent (RW)  Dynamic Standing Balance  Dynamic Standing-Balance Support: Bilateral upper extremity supported (RW)  Dynamic Standing-Balance: Turning  Dynamic Standing-Comments: Close supervision  Activity Tolerance:  Activity Tolerance  Endurance: Endurance does not limit participation in activity  Treatments:  Balance/Neuromuscular Re-Education  Balance/Neuromuscular Re-Education Activity Performed: Yes  Balance/Neuromuscular Re-Education Activity 1: tandem stance B LE anterior, eyes open, maintained for 30 sec, close supervision. min sway  Balance/Neuromuscular Re-Education Activity 2: rhomberg stance, eyes open, 30 sec, close supervision. Min sway    Bed Mobility  Bed Mobility: Yes  Bed Mobility 1  Bed Mobility 1: Supine to sitting, Log roll, Rolling right  Level of Assistance  1: Modified independent (use of bed rail for side lying to sit)    Ambulation/Gait Training  Ambulation/Gait Training Performed: Yes  Ambulation/Gait Training 1  Surface 1: Level tile  Device 1: Rolling walker  Gait Support Devices: Gait belt  Assistance 1: Close supervision  Quality of Gait 1: Narrow base of support, Decreased step length (improved heel strike)  Comments/Distance (ft) 1: 420ft  Transfers  Transfer: Yes  Transfer 1  Transfer From 1: Sit to, Stand to  Transfer to 1: Sit, Stand  Technique 1: Sit to stand, Stand to sit  Transfer Device 1: Walker  Transfer Level of Assistance 1: Close supervision  Transfers 2  Transfer From 2: Bed to  Transfer to 2: Stand  Technique 2: Sit to stand, Stand to sit  Transfer Device 2:  (no device)  Transfer Level of Assistance 2: Close supervision  Trials/Comments 2:  (no use of UE)    Stairs  Stairs: Yes  Stairs  Rails 1: Right (R rail a/descending)  Device 1: Railing  Support Devices 1: Gait belt  Assistance 1: Close supervision  Comment/Number of Steps 1: 6    Outcome Measures:  [facility] Basic Mobility  Turning from your back to your side while in a flat bed without using bedrails: A little  Moving from lying on your back to sitting on the side of a flat bed without using bedrails: A little  Moving to and from bed to chair (including a wheelchair): A little  Standing up from a chair using your arms (e.g. wheelchair or bedside chair): A little  To walk in hospital room: A little  Climbing 3-5 steps with railing: A little  Basic Mobility - Total Score: 18    Tinetti  Sitting Balance: Steady, safe  Arises: Able without using arms  Attempts to Arise: Able to arise, one attempt  Immediate Standing Balance (First 5 Seconds): Steady without walker or other support  Standing Balance: Narrow stance without support  Nudged: Steady without walker or other support  Eyes Closed: Steady  Turned 360 Degrees: Steadiness: Steady  Turned 360 Degrees: Continuity of Steps: Continuous  Sitting  Down: Safe, smooth motion  Balance Score: 16  Initiation of Gait: No hesitancy  Step Height: R Swing Foot: Right foot complete clears floor  Step Length: R Swing Foot: Passes left stance foot  Step Height: L Swing Foot: Left foot complete clears floor  Step Length: L Swing Foot: Passes right stance foot  Step Symmetry: Right and left step appear equal  Step Continuity: Steps appear continuous  Path: Mild/moderate deviation or uses walking aid  Trunk: Marked sway or uses walking aid  Walking Time: Heels almost touching while walking  Gait Score: 9  Total Score: 25    Education Documentation  Precautions, taught by JESU Brownlee at 5/22/2024  9:16 AM.  Learner: Patient  Readiness: Acceptance  Method: Explanation  Response: Verbalizes Understanding  Comment: spinal precautions    Mobility Training, taught by JESU Brownlee at 5/22/2024  9:16 AM.  Learner: Patient  Readiness: Acceptance  Method: Explanation  Response: Verbalizes Understanding  Comment: spinal precautions    Education Comments  No comments found.      Encounter Problems       Encounter Problems (Active)       PT Problem       Patient will complete bed mobility with Cgx1 via log roll sequencing   (Met)       Start:  05/18/24    Expected End:  06/08/24    Resolved:  05/20/24         Patient will complete STS with CGx1 using LRAD without acute LOB   (Met)       Start:  05/18/24    Expected End:  06/08/24    Resolved:  05/20/24         Patient will ambulate >/=75' with LRAD with CGx1 without acute LOB  (Met)       Start:  05/18/24    Expected End:  06/08/24    Resolved:  05/20/24         Patient will complete static (stand by assist) and dynamic (contact guard assist) standing balance activities using LRAD without acute LOB.  (Progressing)       Start:  05/18/24    Expected End:  06/08/24            Patient will participate in BLE there-ex program in order to assist in improving strength and to assist with the completion of functional mobility tasks.   (Progressing)       Start:  05/18/24    Expected End:  06/08/24            Patient will complete bed mobility independently via log roll technique (Progressing)       Start:  05/20/24    Expected End:  06/08/24                Patient will complete sit to stand transfer with close supervision in prep for OOB mobility. (Met)       Start:  05/20/24    Expected End:  06/08/24    Resolved:  05/21/24             Patient will ambulate > 250ft with LRAD and supervision in prep for safe discharge home. (Progressing)       Start:  05/20/24    Expected End:  06/08/24

## 2024-05-22 NOTE — PROGRESS NOTES
Occupational Therapy    OT Treatment    Patient Name: Jimmy Carpenter  MRN: [id number]  Today's Date: 5/22/2024  Time Calculation  Start Time: 0853  Stop Time: 0905  Time Calculation (min): 12 min         Assessment:  OT Assessment: Pt will benefit from continued skilled OT to increase independence in ADLs, fucntional mobility, activity tolerance, and safety  Prognosis: Excellent  Barriers to Discharge: None  Evaluation/Treatment Tolerance: Patient tolerated treatment well  Medical Staff Made Aware: Yes  End of Session Communication: Bedside nurse  End of Session Patient Position: Up in chair, Alarm on  OT Assessment Results: Decreased ADL status, Decreased endurance, Decreased functional mobility  Prognosis: Excellent  Barriers to Discharge: None  Evaluation/Treatment Tolerance: Patient tolerated treatment well  Medical Staff Made Aware: Yes  Strengths: Attitude of self, Housing layout  Barriers to Participation: Comorbidities  Plan:  Treatment Interventions: ADL retraining, Functional transfer training, Endurance training, Patient/family training, Equipment evaluation/education, Compensatory technique education  OT Frequency: 2 times per week  OT Discharge Recommendations: Low intensity level of continued care  Equipment Recommended upon Discharge: Wheeled walker (shower bench)  OT Recommended Transfer Status: Assist of 1  OT - OK to Discharge: Yes  Treatment Interventions: ADL retraining, Functional transfer training, Endurance training, Patient/family training, Equipment evaluation/education, Compensatory technique education    Subjective   Previous Visit Info:  OT Last Visit  OT Received On: 05/22/24  General:  General  Reason for Referral: fall from standing, unclear mechanical vs. Syncopal. Fell striking face, unknown LOC. Pt. Endorses using cocaine; LUE weakness with severe cervical stenosis and mild cord compression  Mildly displaced L nasal bone fx; s/p Partial C2 Laminectomy, L3-5 Laminoplasty, Spinal Cord  Decompression on 5/17. CTH (-) ICH  Past Medical History Relevant to Rehab: denies relevant past medical history  Family/Caregiver Present: No  Prior to Session Communication: Bedside nurse (PT)  Patient Position Received: Up in chair, Alarm off, not on at start of session (hand off from PT)  General Comment: Pt seated in chair upon arrival, agreeable to OT, D/C rec updated from high intensity OT to low intensity OT  Precautions:  Medical Precautions: Fall precautions, Spinal precautions  Post-Surgical Precautions: Spinal precautions  Precautions Comment: log roll for bed mobility    Pain:  Pain Assessment  Pain Assessment: 0-10  Pain Score: 0 - No pain    Objective    Cognition:  Cognition  Overall Cognitive Status: Within Functional Limits  Orientation Level: Oriented X4    Activities of Daily Living:      Grooming  Grooming Level of Assistance: Close supervision, Minimal verbal cues  Grooming Where Assessed: Standing sinkside  Grooming Comments: Pt performed handwashing standing at sink SBA, min VCs for positioning and walker placement    LE Dressing  LE Dressing: Yes  Sock Level of Assistance:  (SBA)  LE Dressing Where Assessed: Chair  LE Dressing Comments: Pt brought B feet to self seated in chair, education provided on LBD and completing while following spinal precautions    Toileting  Toileting Level of Assistance: Contact guard, Minimal verbal cues  Where Assessed: Toilet  Toileting Comments: Pt performed toileting seated, STS CGA FWW, CGA cecile care standing, extended time to complete 2/2 BM    Bed Mobility/Transfers: Bed Mobility  Bed Mobility: No    Transfers  Transfer: Yes  Transfer 1  Transfer From 1: Sit to, Stand to  Transfer to 1: Stand, Sit  Technique 1: Sit to stand, Stand to sit  Transfer Device 1: Walker  Transfer Level of Assistance 1: Close supervision  Trials/Comments 1: VCs for hand placement  Transfers 2  Transfer From 2: Stand to, Toilet to  Transfer to 2: Toilet, Stand  Technique 2: Sit to  stand, Stand to sit  Transfer Device 2: Walker  Transfer Level of Assistance 2: Contact guard, Minimal verbal cues  Trials/Comments 2: VCs for hand placement    Functional Mobility:  Functional Mobility  Functional Mobility Performed: Yes  Functional Mobility 1  Surface 1: Level tile  Device 1: Rolling walker  Assistance 1: Contact guard, Minimal verbal cues  Comments 1: Pt performed fxnl mob min household distance chair <>bathroom CGA FWW.  Sitting Balance:  Static Sitting Balance  Static Sitting-Balance Support: Feet supported  Static Sitting-Level of Assistance: Close supervision  Dynamic Sitting Balance  Dynamic Sitting-Balance Support: Feet supported  Dynamic Sitting-Comments: SBA  Standing Balance:  Static Standing Balance  Static Standing-Balance Support: Bilateral upper extremity supported  Static Standing-Comment/Number of Minutes: CGA  Dynamic Standing Balance  Dynamic Standing-Balance Support: Bilateral upper extremity supported  Dynamic Standing-Comments: CGA  Modalities:  Modalities Used: No  Outcome Measures:[facility] Daily Activity  Putting on and taking off regular lower body clothing: A little  Bathing (including washing, rinsing, drying): A little  Putting on and taking off regular upper body clothing: A little  Toileting, which includes using toilet, bedpan or urinal: A little  Taking care of personal grooming such as brushing teeth: A little  Eating Meals: None  Daily Activity - Total Score: 19    Education Documentation  Body Mechanics, taught by Mati Dumont OT at 5/22/2024  9:17 AM.  Learner: Patient  Readiness: Acceptance  Method: Explanation  Response: Verbalizes Understanding    Precautions, taught by Mati Dumont OT at 5/22/2024  9:17 AM.  Learner: Patient  Readiness: Acceptance  Method: Explanation  Response: Verbalizes Understanding    ADL Training, taught by Mati Dumont OT at 5/22/2024  9:17 AM.  Learner: Patient  Readiness: Acceptance  Method: Explanation  Response: Verbalizes  Understanding    Education Comments  No comments found.      Goals:  Encounter Problems       Encounter Problems (Active)       ADLs       Patient with complete upper body dressing with minimal assist  level of assistance donning and doffing all UE clothes with PRN adaptive equipment while supported sitting (Progressing)       Start:  05/18/24    Expected End:  06/01/24            Patient will complete daily grooming tasks brushing teeth and washing face/hair with stand by assist level of assistance and PRN adaptive equipment while supported sitting. (Progressing)       Start:  05/18/24    Expected End:  06/01/24            Patient will complete toileting including hygiene clothing management/hygiene with minimal assist  level of assistance and raised toilet seat and grab bars. (Met)       Start:  05/18/24    Expected End:  06/01/24    Resolved:  05/22/24            TRANSFERS       Patient will perform bed mobility minimal assist  level of assistance and bed rails in order to improve safety and independence with mobility (Progressing)       Start:  05/18/24    Expected End:  06/01/24            Patient will complete functional transfer to toilet with least restrictive device with minimal assist  level of assistance. (Met)       Start:  05/18/24    Expected End:  06/01/24    Resolved:  05/22/24              RANJITH Sanchez/PJ

## 2024-05-22 NOTE — PROGRESS NOTES
SW met with patient at bedside to make him aware that [facility] also declined to accept him as patient. SW inquired about whether patient would be agreeable to SNF. Patient politely declined SNF, stating that therapy said that he could go home. SW reviewed therapy notes and they are now recommending patient for low intensity. Patient is pending discharge home with home care when medically cleared. SW will continue to follow to assist with discharge plan, if needed.     Kenzie Summers, XUANW

## 2024-05-23 ENCOUNTER — HOME HEALTH ADMISSION (OUTPATIENT)
Dept: HOME HEALTH SERVICES | Facility: HOME HEALTH | Age: 69
End: 2024-05-23
Payer: MEDICARE

## 2024-05-23 PROCEDURE — 97116 GAIT TRAINING THERAPY: CPT | Mod: GP

## 2024-05-23 PROCEDURE — 97535 SELF CARE MNGMENT TRAINING: CPT | Mod: GO

## 2024-05-23 PROCEDURE — 2500000004 HC RX 250 GENERAL PHARMACY W/ HCPCS (ALT 636 FOR OP/ED): Performed by: PHYSICIAN ASSISTANT

## 2024-05-23 PROCEDURE — 2500000004 HC RX 250 GENERAL PHARMACY W/ HCPCS (ALT 636 FOR OP/ED)

## 2024-05-23 PROCEDURE — 2500000001 HC RX 250 WO HCPCS SELF ADMINISTERED DRUGS (ALT 637 FOR MEDICARE OP)

## 2024-05-23 PROCEDURE — 1100000001 HC PRIVATE ROOM DAILY

## 2024-05-23 RX ADMIN — SENNOSIDES 8.6 MG: 8.6 TABLET, FILM COATED ORAL at 20:06

## 2024-05-23 RX ADMIN — ENOXAPARIN SODIUM 30 MG: 100 INJECTION SUBCUTANEOUS at 09:01

## 2024-05-23 RX ADMIN — ENOXAPARIN SODIUM 30 MG: 100 INJECTION SUBCUTANEOUS at 20:06

## 2024-05-23 RX ADMIN — DOCUSATE SODIUM 100 MG: 100 CAPSULE, LIQUID FILLED ORAL at 09:01

## 2024-05-23 RX ADMIN — POLYETHYLENE GLYCOL 3350 17 G: 17 POWDER, FOR SOLUTION ORAL at 09:01

## 2024-05-23 RX ADMIN — DOCUSATE SODIUM 100 MG: 100 CAPSULE, LIQUID FILLED ORAL at 20:06

## 2024-05-23 ASSESSMENT — COGNITIVE AND FUNCTIONAL STATUS - GENERAL
WALKING IN HOSPITAL ROOM: A LITTLE
TOILETING: A LITTLE
MOBILITY SCORE: 21
CLIMB 3 TO 5 STEPS WITH RAILING: A LITTLE
DRESSING REGULAR UPPER BODY CLOTHING: A LITTLE
MOVING TO AND FROM BED TO CHAIR: A LITTLE
DAILY ACTIVITIY SCORE: 19
DRESSING REGULAR LOWER BODY CLOTHING: A LITTLE
HELP NEEDED FOR BATHING: A LITTLE
PERSONAL GROOMING: A LITTLE

## 2024-05-23 ASSESSMENT — PAIN - FUNCTIONAL ASSESSMENT
PAIN_FUNCTIONAL_ASSESSMENT: 0-10

## 2024-05-23 ASSESSMENT — PAIN SCALES - GENERAL
PAINLEVEL_OUTOF10: 0 - NO PAIN

## 2024-05-23 ASSESSMENT — ACTIVITIES OF DAILY LIVING (ADL): HOME_MANAGEMENT_TIME_ENTRY: 12

## 2024-05-23 NOTE — PROGRESS NOTES
Occupational Therapy    OT Treatment    Patient Name: Jimmy Carpenter  MRN: [id number]  Today's Date: 5/23/2024  Time Calculation  Start Time: 1512  Stop Time: 1524  Time Calculation (min): 12 min         Assessment:  OT Assessment: Pt will benefit from continued skilled OT to increase independence in ADLs, functional mobility, activity tolerance, and safety.  Prognosis: Excellent  Barriers to Discharge: None  Evaluation/Treatment Tolerance: Patient tolerated treatment well  Medical Staff Made Aware: Yes  End of Session Communication: Bedside nurse  End of Session Patient Position: Bed, 3 rail up, Alarm on  OT Assessment Results: Decreased ADL status, Decreased endurance, Decreased functional mobility  Prognosis: Excellent  Barriers to Discharge: None  Evaluation/Treatment Tolerance: Patient tolerated treatment well  Medical Staff Made Aware: Yes  Plan:  Treatment Interventions: ADL retraining, Functional transfer training, UE strengthening/ROM, Endurance training, Patient/family training, Equipment evaluation/education, Compensatory technique education  OT Frequency: 2 times per week  OT Discharge Recommendations: Low intensity level of continued care  Equipment Recommended upon Discharge: Wheeled walker  OT Recommended Transfer Status: Assist of 1  OT - OK to Discharge: Yes  Treatment Interventions: ADL retraining, Functional transfer training, UE strengthening/ROM, Endurance training, Patient/family training, Equipment evaluation/education, Compensatory technique education    Subjective   Previous Visit Info:  OT Last Visit  OT Received On: 05/23/24  General:  General  Reason for Referral: fall from standing, unclear mechanical vs. Syncopal. Fell striking face, unknown LOC. Pt. Endorses using cocaine; LUE weakness with severe cervical stenosis and mild cord compression  Mildly displaced L nasal bone fx; s/p Partial C2 Laminectomy, L3-5 Laminoplasty, Spinal Cord Decompression on 5/17. CTH (-) ICH  Past Medical  History Relevant to Rehab: denies relevant past medical history  Family/Caregiver Present: No  Prior to Session Communication: Bedside nurse  Patient Position Received: Bed, 3 rail up, Alarm off, not on at start of session  General Comment: Pt supine in bed upon arrival, agreeable to OT  Precautions:  Medical Precautions: Fall precautions, Spinal precautions  Post-Surgical Precautions: Spinal precautions  Precautions Comment: log roll for bed mobility    Pain:  Pain Assessment  Pain Assessment: 0-10  Pain Score: 0 - No pain    Objective    Cognition:  Cognition  Overall Cognitive Status: Within Functional Limits  Orientation Level: Oriented X4    Activities of Daily Living:      Grooming  Grooming Level of Assistance: Close supervision, Minimal verbal cues  Grooming Where Assessed: Standing sinkside  Grooming Comments: Pt performed handwashing standing at sink SBA FWW    LE Dressing  LE Dressing: Yes  Sock Level of Assistance: Contact guard  LE Dressing Where Assessed: Edge of bed  LE Dressing Comments: donned B socks seated EOB CGA bringing feet to self    Toileting  Toileting Level of Assistance: Contact guard, Minimal verbal cues  Where Assessed: Toilet  Toileting Comments: Pt performed toileting seated on toilet, STS 2x CGA FWW, cecile care SBA standing    Bed Mobility/Transfers: Bed Mobility  Bed Mobility: Yes  Bed Mobility 1  Bed Mobility 1: Supine to sitting, Sitting to supine  Level of Assistance 1: Independent  Bed Mobility Comments 1: VCs for log roll    Transfers  Transfer: Yes  Transfer 1  Transfer From 1: Sit to, Stand to  Transfer to 1: Stand, Sit  Technique 1: Sit to stand, Stand to sit  Transfer Device 1: Walker  Transfer Level of Assistance 1: Close supervision  Trials/Comments 1: VCs for hand placement  Transfers 2  Transfer From 2: Stand to, Toilet to  Transfer to 2: Toilet, Stand  Technique 2: Sit to stand, Stand to sit  Transfer Device 2: Walker  Transfer Level of Assistance 2: Close  supervision  Trials/Comments 2: 2x    Functional Mobility:  Functional Mobility  Functional Mobility Performed: Yes  Functional Mobility 1  Surface 1: Level tile  Device 1: Rolling walker  Assistance 1: Contact guard, Minimal verbal cues  Comments 1: fxnl mob min household distance bed <> bathroom CGA FWW  Sitting Balance:  Static Sitting Balance  Static Sitting-Balance Support: Feet supported  Static Sitting-Level of Assistance: Independent  Dynamic Sitting Balance  Dynamic Sitting-Balance Support: Feet supported  Dynamic Sitting-Comments: SBA  Standing Balance:  Static Standing Balance  Static Standing-Balance Support: Bilateral upper extremity supported  Static Standing-Comment/Number of Minutes: CGA  Dynamic Standing Balance  Dynamic Standing-Balance Support: Bilateral upper extremity supported  Dynamic Standing-Comments: CGA  Modalities:  Modalities Used: No    Outcome Measures:[facility] Daily Activity  Putting on and taking off regular lower body clothing: A little  Bathing (including washing, rinsing, drying): A little  Putting on and taking off regular upper body clothing: A little  Toileting, which includes using toilet, bedpan or urinal: A little  Taking care of personal grooming such as brushing teeth: A little  Eating Meals: None  Daily Activity - Total Score: 19    Education Documentation  Body Mechanics, taught by Mati Dumont OT at 5/23/2024  4:04 PM.  Learner: Patient  Readiness: Acceptance  Method: Explanation  Response: Verbalizes Understanding    Precautions, taught by Mati Dumont OT at 5/23/2024  4:04 PM.  Learner: Patient  Readiness: Acceptance  Method: Explanation  Response: Verbalizes Understanding    ADL Training, taught by Mati Dumont OT at 5/23/2024  4:04 PM.  Learner: Patient  Readiness: Acceptance  Method: Explanation  Response: Verbalizes Understanding    Education Comments  No comments found.        Goals:  Encounter Problems       Encounter Problems (Active)       ADLs        Patient with complete upper body dressing with minimal assist  level of assistance donning and doffing all UE clothes with PRN adaptive equipment while supported sitting (Progressing)       Start:  05/18/24    Expected End:  06/01/24            Patient will complete daily grooming tasks brushing teeth and washing face/hair with stand by assist level of assistance and PRN adaptive equipment while supported sitting. (Progressing)       Start:  05/18/24    Expected End:  06/01/24            Patient will complete toileting including hygiene clothing management/hygiene with minimal assist  level of assistance and raised toilet seat and grab bars. (Met)       Start:  05/18/24    Expected End:  06/01/24    Resolved:  05/22/24            TRANSFERS       Patient will perform bed mobility minimal assist  level of assistance and bed rails in order to improve safety and independence with mobility (Progressing)       Start:  05/18/24    Expected End:  06/01/24            Patient will complete functional transfer to toilet with least restrictive device with minimal assist  level of assistance. (Met)       Start:  05/18/24    Expected End:  06/01/24    Resolved:  05/22/24            RANJITH Sanchez/PJ

## 2024-05-23 NOTE — PROGRESS NOTES
Transitional Care Coordinator Note: Per medical team (trauma) patient is not medically ready,plan to monitor drain for possible removal prior to discharge. Discharge dispo: Patient evaluated by therapy now rec low intensity.   TCC informed and educated patient on therapy recs. Patient expressed understanding and agreeable to discharge plan, selected [facility] as agency of choice. [facility] team reviewing case for possible acceptance. DME referral sent to [facility] for FWW. Referral accepted and approved to be dispensed from [facility]'s closet. TCC to provide walker.    Ruben LACKEYN   Transitional Care Coordinator       UPDATE 5/23/2024 17:20   FWW delivered to bedside. [facility] does not have SOC date within 48hrs. Team update plan to follow up for new HC agency or SOC availability with [facility] 5/24.     Ruben LARIOS   Transitional Care Coordinator

## 2024-05-23 NOTE — CARE PLAN
The patient's goals for the shift include      The clinical goals for the shift include pt will get quality rest throughout nightshift

## 2024-05-23 NOTE — PROGRESS NOTES
Physical Therapy    Physical Therapy Treatment    Patient Name: Jimmy Carpenter  MRN: [id number]  Today's Date: 5/23/2024  Time Calculation  Start Time: 0926  Stop Time: 0943  Time Calculation (min): 17 min    Assessment/Plan   PT Assessment  End of Session Communication: Bedside nurse  Assessment Comment: Pt tolerated PT well without fatigue. Able to AMB 700ft with significantly less path deviation. No LOB. Completed bed mobility independently and transfers with mod independence. Pt a/descended 12 steps utilizing R rail for a/descending. Pt navigated steps with a reciprocal gait pattern. Pt continues to benefit from skilled PT. Pt remains appropriate for low intensity PT.  End of Session Patient Position: Up in chair, Alarm off, not on at start of session  PT Plan  Inpatient/Swing Bed or Outpatient: Inpatient  PT Plan  Treatment/Interventions: Bed mobility, Transfer training, Gait training, Neuromuscular re-education, Strengthening, Therapeutic exercise, Therapeutic activity, Postural re-education  PT Plan: Skilled PT  PT Frequency: Daily  PT Discharge Recommendations: Low intensity level of continued care  Equipment Recommended upon Discharge: Wheeled walker  PT Recommended Transfer Status: Stand by assist  PT - OK to Discharge: Yes (eval complete and discharge recommendations made)      General Visit Information:   PT  Visit  PT Received On: 05/23/24  General  Reason for Referral: fall from standing, unclear mechanical vs. Syncopal. Fell striking face, unknown LOC. Pt. Endorses using cocaine; LUE weakness with severe cervical stenosis and mild cord compression  Mildly displaced L nasal bone fx; s/p Partial C2 Laminectomy, L3-5 Laminoplasty, Spinal Cord Decompression on 5/17. CTH (-) ICH  Past Medical History Relevant to Rehab: denies relevant past medical history  Prior to Session Communication: Bedside nurse  Patient Position Received: Bed, 3 rail up, Alarm off, not on at start of session  General Comment: Pt  "cleared for PT by RN. Alert and agreeable to therapy. Soft collar, PIV    Subjective   Precautions:  Precautions  Medical Precautions: Fall precautions, Spinal precautions  Post-Surgical Precautions: Spinal precautions  Precautions Comment: log roll for bed mobility    Objective   Pain:  Pain Assessment  Pain Assessment: 0-10  Pain Score: 0 - No pain  Cognition:  Cognition  Overall Cognitive Status: Within Functional Limits  Coordination:  Movements are Fluid and Coordinated: Yes  Postural Control:  Postural Control  Postural Control: Within Functional Limits  Static Sitting Balance  Static Sitting-Balance Support: Feet supported, No upper extremity supported  Static Sitting-Level of Assistance: Independent  Dynamic Sitting Balance  Dynamic Sitting-Balance Support: Feet supported, No upper extremity supported  Dynamic Sitting-Balance: Forward lean  Dynamic Sitting-Comments: independent  Static Standing Balance  Static Standing-Balance Support: Bilateral upper extremity supported (RW)  Static Standing-Level of Assistance: Modified independent  Dynamic Standing Balance  Dynamic Standing-Balance Support: Bilateral upper extremity supported (RW)  Dynamic Standing-Balance: Turning  Dynamic Standing-Comments: Modified independent  Activity Tolerance:  Activity Tolerance  Endurance: Endurance does not limit participation in activity  Treatments:  Therapeutic Exercise  Therapeutic Exercise Performed: Yes  Therapeutic Exercise Activity 1: ankle pumps, seated marches, LAQ x3\" hold, all 10x BLE    Therapeutic Activity  Therapeutic Activity Performed: Yes  Therapeutic Activity 1: bed mobility, transfers, pt education on spinal precautions    Bed Mobility  Bed Mobility: Yes  Bed Mobility 1  Bed Mobility 1: Supine to sitting, Log roll, Rolling right  Level of Assistance 1: Independent  Bed Mobility Comments 1: via log roll    Ambulation/Gait Training  Ambulation/Gait Training Performed: Yes  Ambulation/Gait Training 1  Surface 1: " Level tile  Device 1: Rolling walker  Gait Support Devices: Gait belt  Assistance 1: Modified independent  Quality of Gait 1: Decreased step length  Comments/Distance (ft) 1: 700ft  Transfers  Transfer: Yes  Transfer 1  Transfer From 1: Bed to  Transfer to 1: Stand  Technique 1: Sit to stand  Transfer Device 1: Walker  Transfer Level of Assistance 1: Modified independent  Transfers 2  Transfer From 2: Stand to  Transfer to 2: Chair with arms  Technique 2: Sit to stand  Transfer Level of Assistance 2: Independent  Trials/Comments 2: no use of UEs    Stairs  Stairs: Yes  Stairs  Rails 1: Right (R rail a/descending)  Device 1: Railing  Support Devices 1: Gait belt  Assistance 1: Close supervision  Comment/Number of Steps 1: 12    Outcome Measures:  [facility] Basic Mobility  Turning from your back to your side while in a flat bed without using bedrails: None  Moving from lying on your back to sitting on the side of a flat bed without using bedrails: None  Moving to and from bed to chair (including a wheelchair): A little  Standing up from a chair using your arms (e.g. wheelchair or bedside chair): None  To walk in hospital room: A little  Climbing 3-5 steps with railing: A little  Basic Mobility - Total Score: 21    Education Documentation  Precautions, taught by JESU Brownlee at 5/23/2024 10:04 AM.  Learner: Patient  Readiness: Acceptance  Method: Explanation  Response: Verbalizes Understanding  Comment: Spinal precautions    Mobility Training, taught by JESU Brownlee at 5/23/2024 10:04 AM.  Learner: Patient  Readiness: Acceptance  Method: Explanation  Response: Verbalizes Understanding  Comment: Spinal precautions    Education Comments  No comments found.      Encounter Problems       Encounter Problems (Active)       Mobility       Patient will a/descend 10 steps w/ mod independence utilizing a rail to assist in functional mobility at home (Progressing)       Start:  05/23/24    Expected End:  06/08/24             Patient will ambulate > 200ft w/o assistive device and no path deviation in prep for safe discharge home. (Progressing)       Start:  05/23/24    Expected End:  06/08/24                   PT Problem       Patient will complete bed mobility with Cgx1 via log roll sequencing   (Met)       Start:  05/18/24    Expected End:  06/08/24    Resolved:  05/20/24         Patient will complete STS with CGx1 using LRAD without acute LOB   (Met)       Start:  05/18/24    Expected End:  06/08/24    Resolved:  05/20/24         Patient will ambulate >/=75' with LRAD with CGx1 without acute LOB  (Met)       Start:  05/18/24    Expected End:  06/08/24    Resolved:  05/20/24         Patient will complete static (stand by assist) and dynamic (contact guard assist) standing balance activities using LRAD without acute LOB.  (Progressing)       Start:  05/18/24    Expected End:  06/08/24            Patient will participate in BLE there-ex program in order to assist in improving strength and to assist with the completion of functional mobility tasks.  (Progressing)       Start:  05/18/24    Expected End:  06/08/24            Patient will complete bed mobility independently via log roll technique (Met)       Start:  05/20/24    Expected End:  06/08/24    Resolved:  05/23/24             Patient will complete sit to stand transfer with close supervision in prep for OOB mobility. (Met)       Start:  05/20/24    Expected End:  06/08/24    Resolved:  05/21/24             Patient will ambulate > 250ft with LRAD and supervision in prep for safe discharge home. (Met)       Start:  05/20/24    Expected End:  06/08/24    Resolved:  05/23/24

## 2024-05-23 NOTE — PROGRESS NOTES
[facility]  TRAUMA SERVICE - PROGRESS NOTE    Patient Name: Jimmy Carpenter  MRN: [id number]  Admit Date: 516  : 1955  AGE: 69 y.o.   GENDER: male  ==============================================================================  MECHANISM OF INJURY:   Cocaine-induced from fall from standing    LOC (yes/no?): unclear  Anticoagulant / Anti-platelet Rx? (for what dx?): no  Referring Facility Name (N/A for scene EMR run): n/a    Injuries/problems:  Central cord syndrome with pre-existing stenosis/compression  Mildly displaced L nasal bone fx  Cocaine use d/o    INCIDENTAL FINDINGS:  prox sma filing defect (possible thrombus vs. Chronic dissection flap)     PROCEDURES:  : C3-C5 laminoplasty    ==============================================================================  TODAY'S ASSESSMENT AND PLAN OF CARE:    ## Central cord syndrome, stenosis/compression  - Ortho spine following: soft collar, no HOB/bedrest restrictions, HV drain x1 removed, follow up outpt for wound/clinical check [facility]  - pt/ot rec spine cord rehab (pt. Denied acceptance)  - pain control with tylenol as need mild pain q6, oxycodone 2.5/5 q6 as needed moderate to severe pain    ## Nasal bone fx  - No in house consult, outpt ENT follow up as needed [phone]    ## Cocaine use d/o  - Refusing thrive resources, says he is no longer going to use    FEN/GI/:  - Benign abd exam  - reg diet  - colace/senna bid, as needed miralax  - voiding well     Ppx:   - SCDs  - Lvx    Dispo: Medically clear for discharge, pt denied from acute rehab now PT/OT recommending HHC.     Pt discussed with attending, Dr. Barton    Total face to face time spent with patient/family of 20 minutes, with >50% of the time spent discussing plan of care/management, counseling/educating on disease processes, explaining results of diagnostic testing.    Diego Ramos PA-C  Trauma, Critical Care, and Acute Care  Surgery  [id number]    =======================================================================  CHIEF COMPLAINT / OVERNIGHT EVENTS:   Pt doing well overnight, no complaints of pain, eating appropriately and having BM without issue.    MEDICAL HISTORY / ROS:  Admission history and ROS reviewed. Pertinent changes as follows:    PHYSICAL EXAM:  Heart Rate:  [74-87]   Temp:  [36.2 °C (97.2 °F)-37 °C (98.6 °F)]   Resp:  [12-16]   BP: (122-153)/(76-89)   SpO2:  [98 %-99 %]     Physical Exam:   Physical Exam  Vitals reviewed.   Constitutional:       General: He is not in acute distress.     Appearance: Normal appearance. He is not ill-appearing.      Comments: Patient laying in bed sleeping supine. Awoke to voice.   HENT:      Head: Normocephalic and atraumatic.      Comments: facial abrasion     Right Ear: External ear normal.      Left Ear: External ear normal.      Nose: Nose normal.      Comments: Small abrasion     Mouth/Throat:      Pharynx: Oropharynx is clear.      Comments: poor dentition  Eyes:      Extraocular Movements: Extraocular movements intact.      Conjunctiva/sclera: Conjunctivae normal.   Neck:      Comments: Soft collar in place. Bandage over previous drain site c/d/I.   Cardiovascular:      Rate and Rhythm: Normal rate.      Pulses: Normal pulses.      Heart sounds: Normal heart sounds.   Pulmonary:      Effort: Pulmonary effort is normal. No respiratory distress.      Breath sounds: Normal breath sounds.   Abdominal:      General: There is no distension.      Palpations: Abdomen is soft.      Tenderness: There is no abdominal tenderness.   Musculoskeletal:         General: No swelling, tenderness or deformity. Normal range of motion.      Comments: Moves all extremities purposefully. BUE strength 5/5 with /flexion/extension, BLE strength 5/5 with hip extension, dorsal/planter flexion. SILT. No complaints of numbness, tingling, burning sensation in BUE/BLE.    Skin:     General: Skin is warm and dry.       Capillary Refill: Capillary refill takes less than 2 seconds.   Neurological:      General: No focal deficit present.      Mental Status: He is alert and oriented to person, place, and time.      GCS: GCS eye subscore is 4. GCS verbal subscore is 5. GCS motor subscore is 6.      Sensory: Sensation is intact.      Motor: Motor function is intact. No weakness or tremor.   Psychiatric:         Mood and Affect: Mood normal.         Behavior: Behavior normal.       IMAGING SUMMARY:  (summary of new imaging findings, not a copy of dictation)  No new imaging    LABS:  Results from last 7 days   Lab Units 05/18/24  0144 05/17/24 2004 05/17/24  0014   WBC AUTO x10*3/uL 9.2 9.6 5.8   HEMOGLOBIN g/dL 14.7 14.8 15.4   HEMATOCRIT % 44.4 43.5 43.5   PLATELETS AUTO x10*3/uL 265 262 205     Results from last 7 days   Lab Units 05/18/24  0144 05/17/24 2004   APTT seconds 28 29   INR  1.0 1.0     Results from last 7 days   Lab Units 05/21/24  0836 05/19/24  1219 05/18/24  0144   SODIUM mmol/L 138 138 135*   POTASSIUM mmol/L 4.0 3.6 3.8   CHLORIDE mmol/L 101 101 101   CO2 mmol/L 29 27 25   BUN mg/dL 14 8 9   CREATININE mg/dL 0.82 0.84 0.88   CALCIUM mg/dL 8.7 8.4* 8.2*   GLUCOSE mg/dL 82 129* 117*           Results from last 7 days   Lab Units 05/18/24  0145 05/17/24 2005   POCT PH, ARTERIAL pH 7.45* 7.34*   POCT PCO2, ARTERIAL mm Hg 39 49*   POCT PO2, ARTERIAL mm Hg 100* 204*   POCT HCO3 CALCULATED, ARTERIAL mmol/L 27.1* 26.4*   POCT BASE EXCESS, ARTERIAL mmol/L 3.0 -0.1       I have reviewed all medications, laboratory results, and imaging pertinent for today's encounter.

## 2024-05-24 ENCOUNTER — DOCUMENTATION (OUTPATIENT)
Dept: HOME HEALTH SERVICES | Facility: HOME HEALTH | Age: 69
End: 2024-05-24
Payer: MEDICARE

## 2024-05-24 PROCEDURE — 2500000001 HC RX 250 WO HCPCS SELF ADMINISTERED DRUGS (ALT 637 FOR MEDICARE OP)

## 2024-05-24 PROCEDURE — 2500000004 HC RX 250 GENERAL PHARMACY W/ HCPCS (ALT 636 FOR OP/ED): Performed by: PHYSICIAN ASSISTANT

## 2024-05-24 PROCEDURE — 1100000001 HC PRIVATE ROOM DAILY

## 2024-05-24 PROCEDURE — 2500000004 HC RX 250 GENERAL PHARMACY W/ HCPCS (ALT 636 FOR OP/ED)

## 2024-05-24 PROCEDURE — 97116 GAIT TRAINING THERAPY: CPT | Mod: GP,CQ

## 2024-05-24 RX ADMIN — ENOXAPARIN SODIUM 30 MG: 100 INJECTION SUBCUTANEOUS at 08:20

## 2024-05-24 RX ADMIN — DOCUSATE SODIUM 100 MG: 100 CAPSULE, LIQUID FILLED ORAL at 20:15

## 2024-05-24 RX ADMIN — DOCUSATE SODIUM 100 MG: 100 CAPSULE, LIQUID FILLED ORAL at 08:21

## 2024-05-24 RX ADMIN — SENNOSIDES 8.6 MG: 8.6 TABLET, FILM COATED ORAL at 08:21

## 2024-05-24 RX ADMIN — POLYETHYLENE GLYCOL 3350 17 G: 17 POWDER, FOR SOLUTION ORAL at 08:20

## 2024-05-24 RX ADMIN — SENNOSIDES 8.6 MG: 8.6 TABLET, FILM COATED ORAL at 20:15

## 2024-05-24 RX ADMIN — ENOXAPARIN SODIUM 30 MG: 100 INJECTION SUBCUTANEOUS at 20:15

## 2024-05-24 ASSESSMENT — PAIN SCALES - GENERAL
PAINLEVEL_OUTOF10: 0 - NO PAIN
PAINLEVEL_OUTOF10: 0 - NO PAIN

## 2024-05-24 ASSESSMENT — COGNITIVE AND FUNCTIONAL STATUS - GENERAL
MOBILITY SCORE: 24
CLIMB 3 TO 5 STEPS WITH RAILING: A LITTLE
WALKING IN HOSPITAL ROOM: A LITTLE
MOBILITY SCORE: 22

## 2024-05-24 ASSESSMENT — PAIN - FUNCTIONAL ASSESSMENT
PAIN_FUNCTIONAL_ASSESSMENT: 0-10
PAIN_FUNCTIONAL_ASSESSMENT: 0-10

## 2024-05-24 NOTE — CARE PLAN
The patient's goals for the shift include      The clinical goals for the shift include pt will remain free and safe from falls and injury

## 2024-05-24 NOTE — PROGRESS NOTES
Physical Therapy    Physical Therapy Treatment    Patient Name: Jimmy Carpenter  MRN: [id number]  Today's Date: 5/24/2024  Time Calculation  Start Time: 1412  Stop Time: 1426  Time Calculation (min): 14 min    Assessment/Plan   PT Assessment  PT Assessment Results: Decreased strength, Decreased range of motion, Decreased endurance, Impaired balance, Decreased coordination, Orthopedic restrictions  Rehab Prognosis: Excellent  End of Session Communication: Bedside nurse  End of Session Patient Position: Bed, 3 rail up, Alarm off, not on at start of session  PT Plan  Inpatient/Swing Bed or Outpatient: Inpatient  PT Plan  Treatment/Interventions: Bed mobility, Transfer training, Gait training, Neuromuscular re-education, Strengthening, Therapeutic exercise, Therapeutic activity, Postural re-education  PT Plan: Skilled PT  PT Frequency: Daily  PT Discharge Recommendations: Low intensity level of continued care  Equipment Recommended upon Discharge: Wheeled walker  PT Recommended Transfer Status: Stand by assist  PT - OK to Discharge: Yes (eval complete and discharge recommendations made)      General Visit Information:   PT  Visit  PT Received On: 05/24/24  Response to Previous Treatment: Patient with no complaints from previous session.  General  Prior to Session Communication: Bedside nurse  Patient Position Received: Bed, 3 rail up, Alarm off, not on at start of session  General Comment: Pt supine in bed, very pleasant and motivated  Per handoff with RN, pt is appropriate for therapy, vitals are stable and pain is controlled. Other concerns prior to tx are: none    Subjective   Precautions:  Precautions  Medical Precautions: Fall precautions  Post-Surgical Precautions: Spinal precautions  Braces Applied: soft cervical collar in place  Precautions Comment: log roll for bed mobility      Objective   Pain:  Pain Assessment  Pain Assessment: 0-10  Pain Score: 0 - No pain  Cognition:  Cognition  Overall Cognitive Status:  Within Functional Limits  Orientation Level: Oriented X4    Treatments:     Bed Mobility  Bed Mobility: Yes  Bed Mobility 1  Bed Mobility 1: Supine to sitting, Sitting to supine  Level of Assistance 1: Independent    Ambulation/Gait Training  Ambulation/Gait Training Performed: Yes  Ambulation/Gait Training 1  Surface 1: Level tile  Device 1: No device  Assistance 1: Close supervision  Quality of Gait 1: Narrow base of support, Decreased step length, Soft knee(s) (decreased toe-off)  Comments/Distance (ft) 1: 600'x1  Transfers  Transfer: Yes  Transfer 1  Transfer From 1: Sit to, Stand to  Transfer to 1: Sit  Technique 1: Sit to stand, Stand to sit  Transfer Device 1:  (no AD)  Transfer Level of Assistance 1: Close supervision    Stairs  Stairs: Yes  Stairs  Rails 1: Right  Device 1: Railing  Assistance 1: Close supervision  Comment/Number of Steps 1: 12 steps, recip sequence    Outcome Measures:     [facility] Basic Mobility  Turning from your back to your side while in a flat bed without using bedrails: None  Moving from lying on your back to sitting on the side of a flat bed without using bedrails: None  Moving to and from bed to chair (including a wheelchair): None  Standing up from a chair using your arms (e.g. wheelchair or bedside chair): None  To walk in hospital room: A little  Climbing 3-5 steps with railing: A little  Basic Mobility - Total Score: 22    Education Documentation  Precautions, taught by Cyndi Cordova PTA at 5/24/2024  3:00 PM.  Learner: Patient  Readiness: Acceptance  Method: Explanation, Demonstration  Response: Verbalizes Understanding, Demonstrated Understanding    Mobility Training, taught by Cyndi Cordova PTA at 5/24/2024  3:00 PM.  Learner: Patient  Readiness: Acceptance  Method: Explanation, Demonstration  Response: Verbalizes Understanding, Demonstrated Understanding    Education Comments  No comments found.        OP EDUCATION:       Encounter Problems       Encounter Problems  (Active)       Mobility       Patient will a/descend 10 steps w/ mod independence utilizing a rail to assist in functional mobility at home (Progressing)       Start:  05/23/24    Expected End:  06/08/24            Patient will ambulate > 200ft w/o assistive device and no path deviation in prep for safe discharge home. (Progressing)       Start:  05/23/24    Expected End:  06/08/24                   PT Problem       Patient will complete bed mobility with Cgx1 via log roll sequencing   (Met)       Start:  05/18/24    Expected End:  06/08/24    Resolved:  05/20/24         Patient will complete STS with CGx1 using LRAD without acute LOB   (Met)       Start:  05/18/24    Expected End:  06/08/24    Resolved:  05/20/24         Patient will ambulate >/=75' with LRAD with CGx1 without acute LOB  (Met)       Start:  05/18/24    Expected End:  06/08/24    Resolved:  05/20/24         Patient will complete static (stand by assist) and dynamic (contact guard assist) standing balance activities using LRAD without acute LOB.  (Progressing)       Start:  05/18/24    Expected End:  06/08/24            Patient will participate in BLE there-ex program in order to assist in improving strength and to assist with the completion of functional mobility tasks.  (Progressing)       Start:  05/18/24    Expected End:  06/08/24            Patient will complete bed mobility independently via log roll technique (Met)       Start:  05/20/24    Expected End:  06/08/24    Resolved:  05/23/24             Patient will complete sit to stand transfer with close supervision in prep for OOB mobility. (Met)       Start:  05/20/24    Expected End:  06/08/24    Resolved:  05/21/24             Patient will ambulate > 250ft with LRAD and supervision in prep for safe discharge home. (Met)       Start:  05/20/24    Expected End:  06/08/24    Resolved:  05/23/24                Pain - Adult            KEM Baure

## 2024-05-24 NOTE — CARE PLAN
Problem: Skin  Goal: Decreased wound size/increased tissue granulation at next dressing change  Outcome: Progressing  Goal: Participates in plan/prevention/treatment measures  Outcome: Progressing  Goal: Prevent/manage excess moisture  Outcome: Progressing  Goal: Prevent/minimize sheer/friction injuries  Outcome: Progressing  Goal: Promote/optimize nutrition  Outcome: Progressing  Goal: Promote skin healing  Outcome: Progressing     Problem: Fall/Injury  Goal: Not fall by end of shift  Outcome: Progressing  Goal: Be free from injury by end of the shift  Outcome: Progressing  Goal: Verbalize understanding of personal risk factors for fall in the hospital  Outcome: Progressing  Goal: Verbalize understanding of risk factor reduction measures to prevent injury from fall in the home  Outcome: Progressing  Goal: Use assistive devices by end of the shift  Outcome: Progressing  Goal: Pace activities to prevent fatigue by end of the shift  Outcome: Progressing     Problem: Pain - Adult  Goal: Verbalizes/displays adequate comfort level or baseline comfort level  Outcome: Progressing     Problem: Safety - Adult  Goal: Free from fall injury  Outcome: Progressing     Problem: Discharge Planning  Goal: Discharge to home or other facility with appropriate resources  Outcome: Progressing     Problem: Chronic Conditions and Co-morbidities  Goal: Patient's chronic conditions and co-morbidity symptoms are monitored and maintained or improved  Outcome: Progressing   The patient's goals for the shift include      The clinical goals for the shift include pt will reman free of falls throughout shift

## 2024-05-24 NOTE — PROGRESS NOTES
[facility] accepted patient with SOC date 5/28. Patient and medical team notified. Malgorzata Deleon RN, TCC

## 2024-05-24 NOTE — HH CARE COORDINATION
Home Care received a Referral for Nursing and Physical Therapy. We have processed the referral for a Start of Care on 5/28/2024.     If you have any questions or concerns, please feel free to contact us at [phone]. Follow the prompts, enter your five digit zip code, and you will be directed to your care team on [facility].

## 2024-05-24 NOTE — PROGRESS NOTES
[facility]  TRAUMA SERVICE - PROGRESS NOTE    Patient Name: Jimmy Carpenter  MRN: [id number]  Admit Date: 516  : 1955  AGE: 69 y.o.   GENDER: male  ==============================================================================  MECHANISM OF INJURY:   Cocaine-induced from fall from standing    LOC (yes/no?): unclear  Anticoagulant / Anti-platelet Rx? (for what dx?): no  Referring Facility Name (N/A for scene EMR run): n/a    Injuries/problems:  Central cord syndrome with pre-existing stenosis/compression  Mildly displaced L nasal bone fx  Cocaine use d/o    INCIDENTAL FINDINGS:  prox sma filing defect (possible thrombus vs. Chronic dissection flap)     PROCEDURES:  : C3-C5 laminoplasty    ==============================================================================  TODAY'S ASSESSMENT AND PLAN OF CARE:    ## Central cord syndrome, stenosis/compression  - Ortho spine following: soft collar, no HOB/bedrest restrictions, HV drain x1 removed, follow up outpt for wound/clinical check [facility]  - pt/ot rec spine cord rehab (pt. Denied acceptance)  - pain control with tylenol as need mild pain q6, oxycodone 2.5/5 q6 as needed moderate to severe pain    ## Nasal bone fx  - No in house consult, outpt ENT follow up as needed [phone]    ## Cocaine use d/o  - Refusing thrive resources, says he is no longer going to use    FEN/GI/:  - Benign abd exam  - reg diet  - colace/senna bid, as needed miralax  - voiding well     Ppx:   - SCDs  - Lvx    Dispo: Medically clear for discharge, pt denied from acute rehab now Pending [facility] with possible discharge on .     Pt discussed with attending, Dr. Barton    Total face to face time spent with patient/family of 20 minutes, with >50% of the time spent discussing plan of care/management, counseling/educating on disease processes, explaining results of diagnostic testing.    Diego Ramos PA-C  Trauma, Critical Care, and Acute Care  Surgery  [id number]    =======================================================================  CHIEF COMPLAINT / OVERNIGHT EVENTS:   Pt doing well overnight, no complaints of pain, eating appropriately and having BM without issue.    MEDICAL HISTORY / ROS:  Admission history and ROS reviewed. Pertinent changes as follows:    PHYSICAL EXAM:  Heart Rate:  [82-92]   Temp:  [36.7 °C (98.1 °F)-37.5 °C (99.5 °F)]   Resp:  [16-17]   BP: (121-149)/(73-84)   SpO2:  [95 %-98 %]     Physical Exam:   Physical Exam  Vitals reviewed.   Constitutional:       General: He is not in acute distress.     Appearance: Normal appearance. He is not ill-appearing.      Comments: Patient laying in bed supine comfortably    HENT:      Head: Normocephalic and atraumatic.      Comments: facial abrasion     Right Ear: External ear normal.      Left Ear: External ear normal.      Nose: Nose normal.      Comments: Small abrasion     Mouth/Throat:      Pharynx: Oropharynx is clear.      Comments: poor dentition  Eyes:      Extraocular Movements: Extraocular movements intact.      Conjunctiva/sclera: Conjunctivae normal.   Neck:      Comments: Soft collar in place. Bandage over previous drain site c/d/I x 2.   Cardiovascular:      Rate and Rhythm: Normal rate.      Pulses: Normal pulses.      Heart sounds: Normal heart sounds.   Pulmonary:      Effort: Pulmonary effort is normal. No respiratory distress.      Breath sounds: Normal breath sounds.   Abdominal:      General: There is no distension.      Palpations: Abdomen is soft.      Tenderness: There is no abdominal tenderness.   Musculoskeletal:         General: No swelling, tenderness or deformity. Normal range of motion.      Comments: Moves all extremities purposefully. Left upper extremity strength 4+/5 with /flexion/extension. RUE strength 5/5. BLE strength 5/5 with hip extension, dorsal/planter flexion. SILT. No complaints of numbness, tingling, burning sensation in BUE/BLE.    Skin:      General: Skin is warm and dry.      Capillary Refill: Capillary refill takes less than 2 seconds.   Neurological:      General: No focal deficit present.      Mental Status: He is alert and oriented to person, place, and time.      GCS: GCS eye subscore is 4. GCS verbal subscore is 5. GCS motor subscore is 6.      Sensory: Sensation is intact.      Motor: Motor function is intact. No weakness or tremor.   Psychiatric:         Mood and Affect: Mood normal.         Behavior: Behavior normal.       IMAGING SUMMARY:  (summary of new imaging findings, not a copy of dictation)  No new imaging    LABS:  Results from last 7 days   Lab Units 05/18/24  0144 05/17/24 2004   WBC AUTO x10*3/uL 9.2 9.6   HEMOGLOBIN g/dL 14.7 14.8   HEMATOCRIT % 44.4 43.5   PLATELETS AUTO x10*3/uL 265 262     Results from last 7 days   Lab Units 05/18/24  0144 05/17/24 2004   APTT seconds 28 29   INR  1.0 1.0     Results from last 7 days   Lab Units 05/21/24  0836 05/19/24  1219 05/18/24  0144   SODIUM mmol/L 138 138 135*   POTASSIUM mmol/L 4.0 3.6 3.8   CHLORIDE mmol/L 101 101 101   CO2 mmol/L 29 27 25   BUN mg/dL 14 8 9   CREATININE mg/dL 0.82 0.84 0.88   CALCIUM mg/dL 8.7 8.4* 8.2*   GLUCOSE mg/dL 82 129* 117*           Results from last 7 days   Lab Units 05/18/24  0145 05/17/24 2005   POCT PH, ARTERIAL pH 7.45* 7.34*   POCT PCO2, ARTERIAL mm Hg 39 49*   POCT PO2, ARTERIAL mm Hg 100* 204*   POCT HCO3 CALCULATED, ARTERIAL mmol/L 27.1* 26.4*   POCT BASE EXCESS, ARTERIAL mmol/L 3.0 -0.1       I have reviewed all medications, laboratory results, and imaging pertinent for today's encounter.

## 2024-05-25 PROCEDURE — 97116 GAIT TRAINING THERAPY: CPT | Mod: GP,CQ

## 2024-05-25 PROCEDURE — 2500000001 HC RX 250 WO HCPCS SELF ADMINISTERED DRUGS (ALT 637 FOR MEDICARE OP)

## 2024-05-25 PROCEDURE — 2500000004 HC RX 250 GENERAL PHARMACY W/ HCPCS (ALT 636 FOR OP/ED)

## 2024-05-25 PROCEDURE — 1100000001 HC PRIVATE ROOM DAILY

## 2024-05-25 PROCEDURE — 2500000004 HC RX 250 GENERAL PHARMACY W/ HCPCS (ALT 636 FOR OP/ED): Performed by: PHYSICIAN ASSISTANT

## 2024-05-25 RX ADMIN — ENOXAPARIN SODIUM 30 MG: 100 INJECTION SUBCUTANEOUS at 08:40

## 2024-05-25 RX ADMIN — POLYETHYLENE GLYCOL 3350 17 G: 17 POWDER, FOR SOLUTION ORAL at 08:40

## 2024-05-25 RX ADMIN — SENNOSIDES 8.6 MG: 8.6 TABLET, FILM COATED ORAL at 08:40

## 2024-05-25 RX ADMIN — ENOXAPARIN SODIUM 30 MG: 100 INJECTION SUBCUTANEOUS at 20:42

## 2024-05-25 RX ADMIN — DOCUSATE SODIUM 100 MG: 100 CAPSULE, LIQUID FILLED ORAL at 08:40

## 2024-05-25 RX ADMIN — DOCUSATE SODIUM 100 MG: 100 CAPSULE, LIQUID FILLED ORAL at 20:42

## 2024-05-25 RX ADMIN — SENNOSIDES 8.6 MG: 8.6 TABLET, FILM COATED ORAL at 20:42

## 2024-05-25 ASSESSMENT — COGNITIVE AND FUNCTIONAL STATUS - GENERAL
DAILY ACTIVITIY SCORE: 24
MOBILITY SCORE: 24
DAILY ACTIVITIY SCORE: 24
MOBILITY SCORE: 24
MOBILITY SCORE: 24

## 2024-05-25 ASSESSMENT — PAIN - FUNCTIONAL ASSESSMENT
PAIN_FUNCTIONAL_ASSESSMENT: 0-10
PAIN_FUNCTIONAL_ASSESSMENT: 0-10

## 2024-05-25 ASSESSMENT — PAIN SCALES - GENERAL
PAINLEVEL_OUTOF10: 0 - NO PAIN
PAINLEVEL_OUTOF10: 0 - NO PAIN

## 2024-05-25 NOTE — CARE PLAN
The patient's goals for the shift include      The clinical goals for the shift include pt will not fall while ambulating throughout shift

## 2024-05-25 NOTE — PROGRESS NOTES
[facility]  TRAUMA SERVICE - PROGRESS NOTE    Patient Name: Jimmy Carpenter  MRN: [id number]  Admit Date: 516  : 1955  AGE: 69 y.o.   GENDER: male  ==============================================================================  MECHANISM OF INJURY:   Cocaine-induced from fall from standing    LOC (yes/no?): unclear  Anticoagulant / Anti-platelet Rx? (for what dx?): no  Referring Facility Name (N/A for scene EMR run): n/a    Injuries/problems:  Central cord syndrome with pre-existing stenosis/compression  Mildly displaced L nasal bone fx  Cocaine use d/o    INCIDENTAL FINDINGS:  prox sma filing defect (possible thrombus vs. Chronic dissection flap)     PROCEDURES:  : C3-C5 laminoplasty    ==============================================================================  TODAY'S ASSESSMENT AND PLAN OF CARE:    ## Central cord syndrome, stenosis/compression  - Ortho spine following: soft collar, no HOB/bedrest restrictions, HV drain x1 removed, follow up outpt for wound/clinical check [facility]  - pt/ot rec spine cord rehab (pt. Denied acceptance)  - pain control with tylenol as need mild pain q6, oxycodone 2.5/5 q6 as needed moderate to severe pain    ## Nasal bone fx  - No in house consult, outpt ENT follow up as needed [phone]    ## Cocaine use d/o  - Refusing thrive resources, says he is no longer going to use    FEN/GI/:  - Benign abd exam  - reg diet  - colace/senna bid, as needed miralax  - voiding well     Ppx:   - SCDs  - Lvx    Dispo: Medically clear for discharge, pt denied from acute rehab now Pending [facility] with possible discharge on .     Pt discussed with attending, Dr. Mick Jacobo, CNP  Trauma Surgery  Floor: [id number] TICU: [id number]  Pager: [phone]    Total face to face time spent with patient of 20 minutes, with >50% of the time spent discussing plan of care/management, counseling/educating on disease processes, explaining results of diagnostic  testing.      =======================================================================  CHIEF COMPLAINT / OVERNIGHT EVENTS:   No complaints this AM, patient working with physical therapy and doing well. No acute needs or concerns at this time.    MEDICAL HISTORY / ROS:  Admission history and ROS reviewed. Pertinent changes as follows:    PHYSICAL EXAM:  Heart Rate:  [79-98]   Temp:  [36.3 °C (97.3 °F)-36.7 °C (98.1 °F)]   Resp:  [17-18]   BP: (110-133)/(70-82)   SpO2:  [95 %-98 %]     Physical Exam:   Physical Exam  Vitals reviewed.   Constitutional:       General: He is not in acute distress.     Appearance: Normal appearance. He is not ill-appearing.      Comments: Patient laying in bed supine comfortably    HENT:      Head: Normocephalic and atraumatic.      Comments: facial abrasion     Right Ear: External ear normal.      Left Ear: External ear normal.      Nose: Nose normal.      Comments: Small abrasion     Mouth/Throat:      Pharynx: Oropharynx is clear.      Comments: poor dentition  Eyes:      Extraocular Movements: Extraocular movements intact.      Conjunctiva/sclera: Conjunctivae normal.   Neck:      Comments: Soft collar in place. Bandage over previous drain site c/d/I x 2.   Cardiovascular:      Rate and Rhythm: Normal rate.      Pulses: Normal pulses.      Heart sounds: Normal heart sounds.   Pulmonary:      Effort: Pulmonary effort is normal. No respiratory distress.      Breath sounds: Normal breath sounds.   Abdominal:      General: There is no distension.      Palpations: Abdomen is soft.      Tenderness: There is no abdominal tenderness.   Musculoskeletal:         General: No swelling, tenderness or deformity. Normal range of motion.      Comments: Moves all extremities purposefully. Left upper extremity strength 4+/5 with /flexion/extension. RUE strength 5/5. BLE strength 5/5 with hip extension, dorsal/planter flexion. SILT. No complaints of numbness, tingling, burning sensation in BUE/BLE.     Skin:     General: Skin is warm and dry.      Capillary Refill: Capillary refill takes less than 2 seconds.   Neurological:      General: No focal deficit present.      Mental Status: He is alert and oriented to person, place, and time.      GCS: GCS eye subscore is 4. GCS verbal subscore is 5. GCS motor subscore is 6.      Sensory: Sensation is intact.      Motor: Motor function is intact. No weakness or tremor.   Psychiatric:         Mood and Affect: Mood normal.         Behavior: Behavior normal.       IMAGING SUMMARY:  (summary of new imaging findings, not a copy of dictation)  No new imaging    LABS:              Results from last 7 days   Lab Units 05/21/24  0836 05/19/24  1219   SODIUM mmol/L 138 138   POTASSIUM mmol/L 4.0 3.6   CHLORIDE mmol/L 101 101   CO2 mmol/L 29 27   BUN mg/dL 14 8   CREATININE mg/dL 0.82 0.84   CALCIUM mg/dL 8.7 8.4*   GLUCOSE mg/dL 82 129*                   I have reviewed all medications, laboratory results, and imaging pertinent for today's encounter.

## 2024-05-25 NOTE — PROGRESS NOTES
Physical Therapy    Physical Therapy Treatment    Patient Name: Jimmy Carpenter  MRN: [id number]  Today's Date: 5/25/2024  Time Calculation  Start Time: 1141  Stop Time: 1158  Time Calculation (min): 17 min    Assessment/Plan   PT Assessment  PT Assessment Results: Decreased strength  End of Session Patient Position: Bed, 3 rail up, Alarm off, not on at start of session  PT Plan  Inpatient/Swing Bed or Outpatient: Inpatient  PT Plan  Treatment/Interventions: Bed mobility, Transfer training, Gait training, Neuromuscular re-education, Strengthening, Therapeutic exercise, Therapeutic activity, Postural re-education  PT Plan: Skilled PT  PT Frequency: Daily  PT Discharge Recommendations: Low intensity level of continued care  Equipment Recommended upon Discharge: Wheeled walker  PT Recommended Transfer Status: Stand by assist  PT - OK to Discharge: Yes (eval complete and discharge recommendations made)  Pt completed all mobility with independence. Pt met all PT goals, dicussed pt progression with PT, pt okay to discharge from therapy during stay.       General Visit Information:   PT  Visit  PT Received On: 05/25/24  General  General Comment: Pt supine in bed, very pleasant and motivated    Subjective   Precautions:  Precautions  Medical Precautions: Fall precautions  Post-Surgical Precautions: Spinal precautions  Braces Applied: soft cervical collar in place  Precautions Comment: log roll for bed mobility  Vital Signs:       Objective   Pain:     Cognition:  Cognition  Overall Cognitive Status: Within Functional Limits  Coordination:       Activity Tolerance:  Activity Tolerance  Endurance: Endurance does not limit participation in activity  Treatments:  Therapeutic Exercise  Therapeutic Exercise Performed: Yes  Therapeutic Exercise Activity 1: Standing B LE AROM hip abduction/flexion, marches, heel raises x15    Therapeutic Activity  Therapeutic Activity Performed: Yes  Therapeutic Activity 1: Pt performed static tandem  standing for 10 seconds with no LOB. Pt completed dynamic standing balance picking up object from ground and punching across body.    Bed Mobility  Bed Mobility: Yes  Bed Mobility 1  Bed Mobility 1: Supine to sitting  Level of Assistance 1: Independent  Bed Mobility 2  Bed Mobility  2: Sitting to supine  Level of Assistance 2: Independent    Ambulation/Gait Training  Ambulation/Gait Training Performed: Yes  Ambulation/Gait Training 1  Surface 1: Level tile  Device 1: No device  Assistance 1: Independent  Quality of Gait 1: Narrow base of support  Comments/Distance (ft) 1: 500'  Transfers  Transfer: Yes  Transfer 1  Transfer From 1: Sit to, Stand to  Transfer to 1: Stand, Sit  Technique 1: Sit to stand, Stand to sit  Transfer Device 1:  (no AD)  Transfer Level of Assistance 1: Independent    Stairs  Stairs: Yes  Stairs  Rails 1: Right  Device 1: Railing  Assistance 1: Independent  Comment/Number of Steps 1: 12 with reciprocal pattern    Outcome Measures:  [facility] Basic Mobility  Turning from your back to your side while in a flat bed without using bedrails: None  Moving from lying on your back to sitting on the side of a flat bed without using bedrails: None  Moving to and from bed to chair (including a wheelchair): None  Standing up from a chair using your arms (e.g. wheelchair or bedside chair): None  To walk in hospital room: None  Climbing 3-5 steps with railing: None  Basic Mobility - Total Score: 24    Education Documentation  Precautions, taught by Cheryl Tejeda PTA at 5/25/2024 12:04 PM.  Learner: Patient  Readiness: Acceptance  Method: Explanation  Response: Verbalizes Understanding    Mobility Training, taught by Cheryl Tejeda PTA at 5/25/2024 12:04 PM.  Learner: Patient  Readiness: Acceptance  Method: Explanation  Response: Verbalizes Understanding    Education Comments  No comments found.        OP EDUCATION:       Encounter Problems       Encounter Problems (Active)       Pain - Adult              Encounter Problems (Resolved)       Mobility       Patient will a/descend 10 steps w/ mod independence utilizing a rail to assist in functional mobility at home (Met)       Start:  05/23/24    Expected End:  06/08/24    Resolved:  05/25/24         Patient will ambulate > 200ft w/o assistive device and no path deviation in prep for safe discharge home. (Met)       Start:  05/23/24    Expected End:  06/08/24    Resolved:  05/25/24                PT Problem       Patient will complete bed mobility with Cgx1 via log roll sequencing   (Met)       Start:  05/18/24    Expected End:  06/08/24    Resolved:  05/20/24         Patient will complete STS with CGx1 using LRAD without acute LOB   (Met)       Start:  05/18/24    Expected End:  06/08/24    Resolved:  05/20/24         Patient will ambulate >/=75' with LRAD with CGx1 without acute LOB  (Met)       Start:  05/18/24    Expected End:  06/08/24    Resolved:  05/20/24         Patient will complete static (stand by assist) and dynamic (contact guard assist) standing balance activities using LRAD without acute LOB.  (Met)       Start:  05/18/24    Expected End:  06/08/24    Resolved:  05/25/24         Patient will participate in BLE there-ex program in order to assist in improving strength and to assist with the completion of functional mobility tasks.  (Met)       Start:  05/18/24    Expected End:  06/08/24    Resolved:  05/25/24         Patient will complete bed mobility independently via log roll technique (Met)       Start:  05/20/24    Expected End:  06/08/24    Resolved:  05/23/24             Patient will complete sit to stand transfer with close supervision in prep for OOB mobility. (Met)       Start:  05/20/24    Expected End:  06/08/24    Resolved:  05/21/24             Patient will ambulate > 250ft with LRAD and supervision in prep for safe discharge home. (Met)       Start:  05/20/24    Expected End:  06/08/24    Resolved:  05/23/24

## 2024-05-25 NOTE — CARE PLAN
The patient's goals for the shift include      The clinical goals for the shift include pt will not fall while ambulating throughout shift      Problem: Skin  Goal: Decreased wound size/increased tissue granulation at next dressing change  5/25/2024 0327 by Jayesh Thomas RN  Outcome: Progressing  5/25/2024 0322 by Jayesh Thomas RN  Outcome: Progressing  Goal: Participates in plan/prevention/treatment measures  5/25/2024 0327 by Jayesh Thomas, RN  Outcome: Progressing  5/25/2024 0322 by Jayesh Thomas RN  Outcome: Progressing  Goal: Prevent/manage excess moisture  5/25/2024 0327 by Jayesh Thomas RN  Outcome: Progressing  5/25/2024 0322 by Jayesh Thomas RN  Outcome: Progressing  Goal: Prevent/minimize sheer/friction injuries  5/25/2024 0327 by Jayesh Thomas, MATT  Outcome: Progressing  5/25/2024 0322 by Jayesh Thomas RN  Outcome: Progressing  Goal: Promote/optimize nutrition  5/25/2024 0327 by Jayesh Thomas, RN  Outcome: Progressing  5/25/2024 0322 by Jayesh Thomas RN  Outcome: Progressing  Goal: Promote skin healing  5/25/2024 0327 by Jayesh Thomas, MATT  Outcome: Progressing  5/25/2024 0322 by Jayesh Thomas RN  Outcome: Progressing     Problem: Fall/Injury  Goal: Not fall by end of shift  5/25/2024 0327 by Jayesh Thomas, MATT  Outcome: Progressing  5/25/2024 0322 by Jayesh Thomas RN  Outcome: Progressing  Goal: Be free from injury by end of the shift  5/25/2024 0327 by Jayesh Thomas, MATT  Outcome: Progressing  5/25/2024 0322 by Jayesh Thomas RN  Outcome: Progressing  Goal: Verbalize understanding of personal risk factors for fall in the hospital  5/25/2024 0327 by Jayesh Thomas, MATT  Outcome: Progressing  5/25/2024 0322 by Jayesh Thomas RN  Outcome: Progressing  Goal: Verbalize understanding of risk factor reduction measures to prevent injury from fall in the home  5/25/2024 0327 by Jayesh Thomas, MATT  Outcome: Progressing  5/25/2024 0322 by Jayesh Thomas RN  Outcome: Progressing  Goal: Use assistive devices by end of the shift  5/25/2024 0327 by Jayesh Thomas RN  Outcome: Progressing  5/25/2024 0322 by Jayesh Thomas,  RN  Outcome: Progressing  Goal: Pace activities to prevent fatigue by end of the shift  5/25/2024 0327 by Jayesh Thomas RN  Outcome: Progressing  5/25/2024 0322 by Jayesh Thomas RN  Outcome: Progressing     Problem: Pain - Adult  Goal: Verbalizes/displays adequate comfort level or baseline comfort level  5/25/2024 0327 by Jayesh Thomas RN  Outcome: Progressing  5/25/2024 0322 by Jayesh Thomas RN  Outcome: Progressing     Problem: Safety - Adult  Goal: Free from fall injury  5/25/2024 0327 by Jayesh Thomas RN  Outcome: Progressing  5/25/2024 0322 by Jayesh Thomas RN  Outcome: Progressing     Problem: Discharge Planning  Goal: Discharge to home or other facility with appropriate resources  5/25/2024 0327 by Jayesh Thomas RN  Outcome: Progressing  5/25/2024 0322 by Jayesh Thomas RN  Outcome: Progressing     Problem: Chronic Conditions and Co-morbidities  Goal: Patient's chronic conditions and co-morbidity symptoms are monitored and maintained or improved  5/25/2024 0327 by Jayesh Thomas RN  Outcome: Progressing  5/25/2024 0322 by Jayesh Thomas RN  Outcome: Progressing

## 2024-05-26 LAB
ALBUMIN SERPL BCP-MCNC: 3.4 G/DL (ref 3.4–5)
ANION GAP SERPL CALC-SCNC: 12 MMOL/L (ref 10–20)
BUN SERPL-MCNC: 19 MG/DL (ref 6–23)
CALCIUM SERPL-MCNC: 8.7 MG/DL (ref 8.6–10.6)
CHLORIDE SERPL-SCNC: 102 MMOL/L (ref 98–107)
CO2 SERPL-SCNC: 27 MMOL/L (ref 21–32)
CREAT SERPL-MCNC: 0.83 MG/DL (ref 0.5–1.3)
EGFRCR SERPLBLD CKD-EPI 2021: >90 ML/MIN/1.73M*2
ERYTHROCYTE [DISTWIDTH] IN BLOOD BY AUTOMATED COUNT: 13.8 % (ref 11.5–14.5)
GLUCOSE SERPL-MCNC: 92 MG/DL (ref 74–99)
HCT VFR BLD AUTO: 39.3 % (ref 41–52)
HGB BLD-MCNC: 12.5 G/DL (ref 13.5–17.5)
MCH RBC QN AUTO: 29.1 PG (ref 26–34)
MCHC RBC AUTO-ENTMCNC: 31.8 G/DL (ref 32–36)
MCV RBC AUTO: 91 FL (ref 80–100)
NRBC BLD-RTO: 0 /100 WBCS (ref 0–0)
PHOSPHATE SERPL-MCNC: 2.3 MG/DL (ref 2.5–4.9)
PLATELET # BLD AUTO: 326 X10*3/UL (ref 150–450)
POTASSIUM SERPL-SCNC: 4 MMOL/L (ref 3.5–5.3)
RBC # BLD AUTO: 4.3 X10*6/UL (ref 4.5–5.9)
SODIUM SERPL-SCNC: 137 MMOL/L (ref 136–145)
WBC # BLD AUTO: 4.8 X10*3/UL (ref 4.4–11.3)

## 2024-05-26 PROCEDURE — 85027 COMPLETE CBC AUTOMATED: CPT

## 2024-05-26 PROCEDURE — 80069 RENAL FUNCTION PANEL: CPT

## 2024-05-26 PROCEDURE — 2500000004 HC RX 250 GENERAL PHARMACY W/ HCPCS (ALT 636 FOR OP/ED)

## 2024-05-26 PROCEDURE — 1100000001 HC PRIVATE ROOM DAILY

## 2024-05-26 PROCEDURE — 97116 GAIT TRAINING THERAPY: CPT | Mod: GP,CQ

## 2024-05-26 PROCEDURE — 2500000004 HC RX 250 GENERAL PHARMACY W/ HCPCS (ALT 636 FOR OP/ED): Performed by: PHYSICIAN ASSISTANT

## 2024-05-26 PROCEDURE — 2500000001 HC RX 250 WO HCPCS SELF ADMINISTERED DRUGS (ALT 637 FOR MEDICARE OP)

## 2024-05-26 PROCEDURE — 36415 COLL VENOUS BLD VENIPUNCTURE: CPT

## 2024-05-26 RX ORDER — BISACODYL 10 MG/1
10 SUPPOSITORY RECTAL ONCE
Status: DISCONTINUED | OUTPATIENT
Start: 2024-05-26 | End: 2024-05-27 | Stop reason: HOSPADM

## 2024-05-26 RX ORDER — ASPIRIN 81 MG/1
81 TABLET ORAL DAILY
Status: DISCONTINUED | OUTPATIENT
Start: 2024-05-27 | End: 2024-05-27 | Stop reason: HOSPADM

## 2024-05-26 RX ADMIN — ENOXAPARIN SODIUM 30 MG: 100 INJECTION SUBCUTANEOUS at 09:17

## 2024-05-26 RX ADMIN — ENOXAPARIN SODIUM 30 MG: 100 INJECTION SUBCUTANEOUS at 21:05

## 2024-05-26 RX ADMIN — DOCUSATE SODIUM 100 MG: 100 CAPSULE, LIQUID FILLED ORAL at 09:18

## 2024-05-26 RX ADMIN — POLYETHYLENE GLYCOL 3350 17 G: 17 POWDER, FOR SOLUTION ORAL at 09:18

## 2024-05-26 RX ADMIN — SENNOSIDES 8.6 MG: 8.6 TABLET, FILM COATED ORAL at 09:18

## 2024-05-26 RX ADMIN — DOCUSATE SODIUM 100 MG: 100 CAPSULE, LIQUID FILLED ORAL at 21:05

## 2024-05-26 RX ADMIN — SENNOSIDES 8.6 MG: 8.6 TABLET, FILM COATED ORAL at 21:05

## 2024-05-26 ASSESSMENT — COGNITIVE AND FUNCTIONAL STATUS - GENERAL
MOBILITY SCORE: 24
TURNING FROM BACK TO SIDE WHILE IN FLAT BAD: A LITTLE
CLIMB 3 TO 5 STEPS WITH RAILING: A LITTLE
DAILY ACTIVITIY SCORE: 24
MOVING FROM LYING ON BACK TO SITTING ON SIDE OF FLAT BED WITH BEDRAILS: A LITTLE
WALKING IN HOSPITAL ROOM: A LITTLE
STANDING UP FROM CHAIR USING ARMS: A LITTLE
MOVING TO AND FROM BED TO CHAIR: A LITTLE
MOVING FROM LYING ON BACK TO SITTING ON SIDE OF FLAT BED WITH BEDRAILS: A LITTLE
MOBILITY SCORE: 18
DAILY ACTIVITIY SCORE: 24
WALKING IN HOSPITAL ROOM: A LITTLE
STANDING UP FROM CHAIR USING ARMS: A LITTLE
MOVING TO AND FROM BED TO CHAIR: A LITTLE
CLIMB 3 TO 5 STEPS WITH RAILING: A LITTLE
MOBILITY SCORE: 18
TURNING FROM BACK TO SIDE WHILE IN FLAT BAD: A LITTLE

## 2024-05-26 ASSESSMENT — PAIN - FUNCTIONAL ASSESSMENT
PAIN_FUNCTIONAL_ASSESSMENT: 0-10

## 2024-05-26 ASSESSMENT — PAIN SCALES - GENERAL
PAINLEVEL_OUTOF10: 0 - NO PAIN

## 2024-05-26 NOTE — PROGRESS NOTES
[facility]  TRAUMA SERVICE - PROGRESS NOTE    Patient Name: Jimmy Carpenter  MRN: [id number]  Admit Date: 516  : 1955  AGE: 69 y.o.   GENDER: male  ==============================================================================  MECHANISM OF INJURY:   Cocaine-induced from fall from standing    LOC (yes/no?): unclear  Anticoagulant / Anti-platelet Rx? (for what dx?): no  Referring Facility Name (N/A for scene EMR run): n/a    Injuries/problems:  Central cord syndrome with pre-existing stenosis/compression  Mildly displaced L nasal bone fx  Cocaine use d/o    INCIDENTAL FINDINGS:  prox sma filing defect (possible thrombus vs. Chronic dissection flap)     PROCEDURES:  : C3-C5 laminoplasty    ==============================================================================  TODAY'S ASSESSMENT AND PLAN OF CARE:    ## Central cord syndrome, stenosis/compression  - Ortho spine following: soft collar, no HOB/bedrest restrictions, HV drain x1 removed, follow up outpt for wound/clinical check [facility]  - pt/ot rec spine cord rehab (pt. Denied acceptance)  - pain control with tylenol as need mild pain q6, oxycodone 2.5/5 q6 as needed moderate to severe pain    ## Nasal bone fx  - No in house consult, outpt ENT follow up as needed [phone]    ## Cocaine use d/o  - Refusing thrive resources, says he is no longer going to use    ##Incidental finding - Prox SMA filling defect  - Vascular Medicine consulted, plan to be seen   - Will start 81mg Aspirin  - Follow up Vascular medicine recs    FEN/GI/:  - Reg diet  - colace/senna bid, and miralax daily      -> No BM since , will order dulcolax suppository  - voiding freely    Ppx:   - SCDs  - Lvx    Dispo: Medically clear for discharge, with SOC of . Likely discharge home w/ C     Pt discussed with attending, Dr. Mick Jacobo, CNP  Trauma Surgery  Floor: [id number] TICU: [id number]  Pager: [phone]    Total face to face  time spent with patient of 20 minutes, with >50% of the time spent discussing plan of care/management, counseling/educating on disease processes, explaining results of diagnostic testing.      =======================================================================  CHIEF COMPLAINT / OVERNIGHT EVENTS:   No complaints this AM, patient working with physical therapy and doing well. No acute needs or concerns at this time.    MEDICAL HISTORY / ROS:  Admission history and ROS reviewed. Pertinent changes as follows:    PHYSICAL EXAM:  Heart Rate:  [72-87]   Temp:  [35.9 °C (96.6 °F)-36.5 °C (97.7 °F)]   Resp:  [16-18]   BP: (126-144)/(72-83)   SpO2:  [97 %-98 %]     Physical Exam:   Physical Exam  Vitals reviewed.   Constitutional:       General: He is not in acute distress.     Appearance: Normal appearance. He is not ill-appearing.      Comments: Patient laying in bed supine comfortably    HENT:      Head: Normocephalic and atraumatic.      Comments: facial abrasion     Right Ear: External ear normal.      Left Ear: External ear normal.      Nose: Nose normal.      Comments: Small abrasion     Mouth/Throat:      Pharynx: Oropharynx is clear.      Comments: poor dentition  Eyes:      Extraocular Movements: Extraocular movements intact.      Conjunctiva/sclera: Conjunctivae normal.   Neck:      Comments: Soft collar in place. Bandage over previous drain site c/d/I x 2.   Cardiovascular:      Rate and Rhythm: Normal rate.      Pulses: Normal pulses.      Heart sounds: Normal heart sounds.   Pulmonary:      Effort: Pulmonary effort is normal. No respiratory distress.      Breath sounds: Normal breath sounds.   Abdominal:      General: There is no distension.      Palpations: Abdomen is soft.      Tenderness: There is no abdominal tenderness.   Musculoskeletal:         General: No swelling, tenderness or deformity. Normal range of motion.      Comments: Moves all extremities purposefully. Left upper extremity strength 4+/5  with /flexion/extension. RUE strength 5/5. BLE strength 5/5 with hip extension, dorsal/planter flexion. SILT. No complaints of numbness, tingling, burning sensation in BUE/BLE.    Skin:     General: Skin is warm and dry.      Capillary Refill: Capillary refill takes less than 2 seconds.   Neurological:      General: No focal deficit present.      Mental Status: He is alert and oriented to person, place, and time.      GCS: GCS eye subscore is 4. GCS verbal subscore is 5. GCS motor subscore is 6.      Sensory: Sensation is intact.      Motor: Motor function is intact. No weakness or tremor.   Psychiatric:         Mood and Affect: Mood normal.         Behavior: Behavior normal.       IMAGING SUMMARY:  (summary of new imaging findings, not a copy of dictation)  No new imaging    LABS:  Results from last 7 days   Lab Units 05/26/24  0911   WBC AUTO x10*3/uL 4.8   HEMOGLOBIN g/dL 12.5*   HEMATOCRIT % 39.3*   PLATELETS AUTO x10*3/uL 326             Results from last 7 days   Lab Units 05/26/24  0911 05/21/24  0836   SODIUM mmol/L 137 138   POTASSIUM mmol/L 4.0 4.0   CHLORIDE mmol/L 102 101   CO2 mmol/L 27 29   BUN mg/dL 19 14   CREATININE mg/dL 0.83 0.82   CALCIUM mg/dL 8.7 8.7   GLUCOSE mg/dL 92 82                   I have reviewed all medications, laboratory results, and imaging pertinent for today's encounter.

## 2024-05-26 NOTE — CARE PLAN
The patient's goals for the shift include      The clinical goals for the shift include Patient will remain HDS throughout shift.

## 2024-05-26 NOTE — CARE PLAN
The patient's goals for the shift include  going home.    The clinical goals for the shift include pt will be fall free for the entire shift    Over the shift, the patient did not make progress toward the following goals. Barriers to progression include none. Recommendations to address these barriers include N/A.  Problem: Skin  Goal: Decreased wound size/increased tissue granulation at next dressing change  Outcome: Progressing  Goal: Participates in plan/prevention/treatment measures  Outcome: Progressing  Goal: Prevent/manage excess moisture  Outcome: Progressing  Goal: Prevent/minimize sheer/friction injuries  Outcome: Progressing  Goal: Promote/optimize nutrition  Outcome: Progressing  Goal: Promote skin healing  Outcome: Progressing     Problem: Fall/Injury  Goal: Not fall by end of shift  Outcome: Progressing  Goal: Be free from injury by end of the shift  Outcome: Progressing  Goal: Verbalize understanding of personal risk factors for fall in the hospital  Outcome: Progressing  Goal: Verbalize understanding of risk factor reduction measures to prevent injury from fall in the home  Outcome: Progressing  Goal: Use assistive devices by end of the shift  Outcome: Progressing  Goal: Pace activities to prevent fatigue by end of the shift  Outcome: Progressing     Problem: Pain - Adult  Goal: Verbalizes/displays adequate comfort level or baseline comfort level  Outcome: Progressing     Problem: Safety - Adult  Goal: Free from fall injury  Outcome: Progressing     Problem: Discharge Planning  Goal: Discharge to home or other facility with appropriate resources  Outcome: Progressing     Problem: Chronic Conditions and Co-morbidities  Goal: Patient's chronic conditions and co-morbidity symptoms are monitored and maintained or improved  Outcome: Progressing

## 2024-05-27 VITALS
DIASTOLIC BLOOD PRESSURE: 72 MMHG | OXYGEN SATURATION: 99 % | HEART RATE: 86 BPM | HEIGHT: 70 IN | BODY MASS INDEX: 19.88 KG/M2 | RESPIRATION RATE: 18 BRPM | TEMPERATURE: 97.3 F | SYSTOLIC BLOOD PRESSURE: 112 MMHG | WEIGHT: 138.89 LBS

## 2024-05-27 PROCEDURE — 2500000004 HC RX 250 GENERAL PHARMACY W/ HCPCS (ALT 636 FOR OP/ED)

## 2024-05-27 PROCEDURE — 2500000004 HC RX 250 GENERAL PHARMACY W/ HCPCS (ALT 636 FOR OP/ED): Performed by: PHYSICIAN ASSISTANT

## 2024-05-27 PROCEDURE — 2500000001 HC RX 250 WO HCPCS SELF ADMINISTERED DRUGS (ALT 637 FOR MEDICARE OP)

## 2024-05-27 PROCEDURE — 99222 1ST HOSP IP/OBS MODERATE 55: CPT | Performed by: INTERNAL MEDICINE

## 2024-05-27 PROCEDURE — 99221 1ST HOSP IP/OBS SF/LOW 40: CPT | Performed by: SURGERY

## 2024-05-27 RX ORDER — ACETAMINOPHEN 325 MG/1
650 TABLET ORAL EVERY 8 HOURS PRN
Qty: 15 TABLET | Refills: 0 | Status: SHIPPED | OUTPATIENT
Start: 2024-05-27 | End: 2024-06-01

## 2024-05-27 RX ORDER — OXYCODONE HYDROCHLORIDE 5 MG/1
5 TABLET ORAL EVERY 8 HOURS PRN
Qty: 15 TABLET | Refills: 0 | Status: SHIPPED | OUTPATIENT
Start: 2024-05-27 | End: 2024-06-01

## 2024-05-27 RX ORDER — OXYCODONE HYDROCHLORIDE 5 MG/1
5 TABLET ORAL EVERY 8 HOURS PRN
Status: DISCONTINUED | OUTPATIENT
Start: 2024-05-27 | End: 2024-05-27 | Stop reason: HOSPADM

## 2024-05-27 RX ORDER — ASPIRIN 81 MG/1
81 TABLET ORAL 2 TIMES DAILY
Qty: 60 TABLET | Refills: 0 | Status: SHIPPED | OUTPATIENT
Start: 2024-05-27 | End: 2024-06-26

## 2024-05-27 RX ORDER — OXYCODONE HYDROCHLORIDE 5 MG/1
2.5 TABLET ORAL EVERY 8 HOURS PRN
Status: DISCONTINUED | OUTPATIENT
Start: 2024-05-27 | End: 2024-05-27 | Stop reason: HOSPADM

## 2024-05-27 RX ORDER — SENNOSIDES 8.6 MG/1
1 TABLET ORAL EVERY 12 HOURS PRN
Qty: 10 TABLET | Refills: 0 | Status: SHIPPED | OUTPATIENT
Start: 2024-05-27 | End: 2024-06-01

## 2024-05-27 RX ORDER — DOCUSATE SODIUM 100 MG/1
100 CAPSULE, LIQUID FILLED ORAL 2 TIMES DAILY
Qty: 10 CAPSULE | Refills: 0 | Status: SHIPPED | OUTPATIENT
Start: 2024-05-27 | End: 2024-06-01

## 2024-05-27 RX ADMIN — POLYETHYLENE GLYCOL 3350 17 G: 17 POWDER, FOR SOLUTION ORAL at 08:14

## 2024-05-27 RX ADMIN — ENOXAPARIN SODIUM 30 MG: 100 INJECTION SUBCUTANEOUS at 08:14

## 2024-05-27 RX ADMIN — DOCUSATE SODIUM 100 MG: 100 CAPSULE, LIQUID FILLED ORAL at 08:14

## 2024-05-27 RX ADMIN — SENNOSIDES 8.6 MG: 8.6 TABLET, FILM COATED ORAL at 08:14

## 2024-05-27 RX ADMIN — ASPIRIN 81 MG: 81 TABLET, COATED ORAL at 08:14

## 2024-05-27 ASSESSMENT — PAIN - FUNCTIONAL ASSESSMENT: PAIN_FUNCTIONAL_ASSESSMENT: 0-10

## 2024-05-27 ASSESSMENT — PAIN SCALES - GENERAL: PAINLEVEL_OUTOF10: 0 - NO PAIN

## 2024-05-27 NOTE — CONSULTS
"Reason For Consult  Incidentally found SMA filling defect    History Of Present Illness  Jimmy Carpenter is a 69 y.o. male with hx of cocaine use and no known past medical history that presented to [facility] 10 days ago after a fall from standing. Patient had a trauma workup and was admitted for mild cord compression of his cervical spine. Patient has since been treated for this and is cleared for discharge to spinal cord rehab with plans for transfer to rehab tomorrow.     On trauma workup, he was incidentally found to have a proximal SMA filling defect that could possibly be a thrombus vs chronic dissection flap. Primary team has consulted vascular medicine and recommendations are pending. In the meantime, was started on 81mg aspirin.     Patient denies any GI symptoms including nausea, vomiting, abdominal pain, constipation, diarrhea, melena, or hematochezia.      Past Medical History  Pt denies    Surgical History  Pt denies     Social History  He reports that he has never smoked. He does not have any smokeless tobacco history on file. He reports that he does not currently use alcohol. He reports current drug use. Drug: \"Crack\" cocaine.    Family History  Noncontributory     Allergies  Patient has no known allergies.    Review of Systems  12 point ROS negative unless otherwise noted above.     Physical Exam  General: Nontoxic. no acute distress. Resting comfortably in bed.  Cardiac: regular rate  Respiratory: nonlabored respirations on room air  Abdomen: soft, nontender, nondistended  Extremities: warm and dry  Psych: appropriate mood and affect     Last Recorded Vitals  Blood pressure 133/84, pulse 80, temperature 36.8 °C (98.3 °F), temperature source Oral, resp. rate 18, height 1.779 m (5' 10.04\"), weight 63 kg (138 lb 14.2 oz), SpO2 97%.    Relevant Results  Lab Results   Component Value Date    WBC 4.8 05/26/2024    HGB 12.5 (L) 05/26/2024    HCT 39.3 (L) 05/26/2024    MCV 91 05/26/2024     05/26/2024 "     5/16  CT CHEST/ABDOMEN/PELVIS:  1. No acute traumatic injury.  2. Subtle reticulonodular opacities in the right middle lobe which  may be infectious or inflammatory. Additional chronic biapical lung  changes with probable scarring and soft tissue.  3. Eccentric filling defect within the proximal SMA which could  represent sequela of a chronic dissection flap or thrombus resulting  in moderate intraluminal narrowing and distal reconstitution.     Assessment/Plan   69 year old male with hx of cocaine use presenting with incidentally found SMA filling defect, thrombus vs chronic dissection flap. Not currently symptomatic.    -no acute surgical intervention indicated  -agree with vasc medicine recs- asp, statin, quit cocaine  -can follow outpt with vascular medicine  -vascular surgery will sign off at this time    Discussed with attending, Dr. Santiago.    Laverne Gonzalez MD  General Surgery PGY1  Vascular Surgery Service

## 2024-05-27 NOTE — PROGRESS NOTES
Transitional Care Coordinator Note: Per medical team (trauma) patient is medically ready. Discharge dispo: Plan for patient to discharge home with , [facility] following for SOC 5/28. Bedside nurse updated. Per patient family to assist with transportation home.       Ruben Chapa RN BSN   Transitional Care Coordinator

## 2024-05-27 NOTE — CARE PLAN
The patient's goals for the shift include      The clinical goals for the shift include remain free and safe from falls or injury throughout shift
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
## 2024-05-27 NOTE — CARE PLAN
Problem: Pain - Adult  Goal: Verbalizes/displays adequate comfort level or baseline comfort level  Outcome: Progressing     Problem: Safety - Adult  Goal: Free from fall injury  Outcome: Progressing   The patient's goals for the shift include      The clinical goals for the shift include free from falls throughout shift

## 2024-05-27 NOTE — DISCHARGE SUMMARY
Discharge Diagnosis  Cervical cord compression with myelopathy (Multi)  Nasal bone fracture  Cocaine use d/o  Proximal SMA filling defect    Issues Requiring Follow-Up  Post op check s/p spine surgery  Possible outpt vascular medicine study with sma filling defect    Test Results Pending At Discharge  Pending Labs       Order Current Status    Troponin I, High Sensitivity, Initial Collected (05/16/24 0152)    Troponin Series, (0, 1 HR) In process            Hospital Course  69M without medical problems diagnosed presents s/p fall from standing, +cocaine. Presented with paresthesias and mild weakness LUE. Pan scan without acute traumatic injuries, degenerative changes noted in c-spine region. MRI obtained, showing stenosis and cord compression. Ortho spine consulted, taking patient to OR the following day for decompression. Admitted to trauma service in interim. To ICU for close neurological monitoring.     Taken to OR for C3-C5 laminoplasty. Post-operatively with similar weakness L hand. To require soft collar without other restrictions. Rec spine cord rehab per therapy. Tolerating PO with adequate spontaneous bowel/bladder function.     While in house, mobility improved. Rec for home care with walker. Vascular surgery consulted for asymptomatic possible dissection flap, rec no acute intervention or follow up. Vascular medicine rec aspirin 81 mg BID, refrain from further cocaine (patient refusing resources, but adamant that will quit) and follow up Dr. Llanos. Discharged home with family.     Pertinent Physical Exam At Time of Discharge  Physical Exam      Physical Exam:   GEN: No acute distress  SKIN: Warm and dry  Neck: posterior incision now RAO and c/d/I, soft collar in place  CARDIO: Rate controlled rhythm  RESP: Nml resp rate RA without resp distress  GI: Soft, NT, ND  : deferred  MSK: CHAVEZ  EXTREM: No pitting edema  NEURO: Alert and oriented with GCS 15, SILTx4. 4+/5 L hand  compared to contralateral  side (stable)  PSYCH: Nml affect    Total face to face time spent with patient/family of 30 minutes, with >50% of the time spent discussing plan of care/management, counseling/educating on disease processes, explaining results of diagnostic testing.        Home Medications     Medication List      START taking these medications     acetaminophen 325 mg tablet; Commonly known as: Tylenol; Take 2 tablets   (650 mg) by mouth every 8 hours if needed for mild pain (1 - 3) for up to   5 days.   aspirin 81 mg EC tablet; Take 1 tablet (81 mg) by mouth 2 times a day.   docusate sodium 100 mg capsule; Commonly known as: Colace; Take 1   capsule (100 mg) by mouth 2 times a day for 5 days.   oxyCODONE 5 mg immediate release tablet; Commonly known as: Roxicodone;   Take 1 tablet (5 mg) by mouth every 8 hours if needed for moderate pain (4   - 6) or severe pain (7 - 10) for up to 5 days.   sennosides 8.6 mg tablet; Commonly known as: Senokot; Take 1 tablet (8.6   mg) by mouth every 12 hours if needed for constipation for up to 5 days.       Outpatient Follow-Up  Future Appointments   Date Time Provider Department Center   5/29/2024  1:00 PM LUIS [id number] [facility]   6/12/2024 11:20 AM MD JOHANNA [id number] [facility]       Nas Lugo PA-C

## 2024-05-27 NOTE — NURSING NOTE
Nurse went over discharge paperwork with patient and family at bedside. Both parties understood well had no comments or concerns at this time. IV access was removed x2 from bilateral arms pt. Tolerated well. Patient in stable condition upon discharge and educated on the importance of keeping c-collar in place.

## 2024-05-31 ENCOUNTER — TELEPHONE (OUTPATIENT)
Dept: HOME HEALTH SERVICES | Facility: HOME HEALTH | Age: 69
End: 2024-05-31
Payer: MEDICARE

## 2024-05-31 NOTE — TELEPHONE ENCOUNTER
Hello,    Your recent home care referral for Jimmy Carpenter has been made a Non Admit with  Home Care due to Inability to Contact Patient. If you have further questions, feel free to reach out to our office at [phone].     Thank you,   [facility]

## 2024-06-12 ENCOUNTER — APPOINTMENT (OUTPATIENT)
Dept: ORTHOPEDIC SURGERY | Facility: CLINIC | Age: 69
End: 2024-06-12
Payer: MEDICARE

## 2024-06-12 DIAGNOSIS — G95.20 CERVICAL CORD COMPRESSION WITH MYELOPATHY (MULTI): ICD-10-CM

## 2024-07-08 ENCOUNTER — TELEPHONE (OUTPATIENT)
Dept: CARDIOLOGY | Facility: HOSPITAL | Age: 69
End: 2024-07-08
Payer: MEDICARE

## 2024-07-08 NOTE — TELEPHONE ENCOUNTER
Attempted to reach pt for his 9am virtual appt, phone rang non-stop and never went to .  Pt did not respond to text prompts to log on.

## (undated) DEVICE — SUTURE, VICRYL, 4-0, 18 IN, UNDYED BR PS-2

## (undated) DEVICE — COVER, TABLE, UHC

## (undated) DEVICE — DRESSING, GAUZE, WASHED FLUFF, LARGE, STERILE

## (undated) DEVICE — MANIFOLD, 4 PORT NEPTUNE STANDARD

## (undated) DEVICE — Device

## (undated) DEVICE — TIP, SUCTION, FRAZIER, W/CONTROL VENT, 10 FR

## (undated) DEVICE — LEGEND, BALL FLUTED, 7 CM, 1.5 MM

## (undated) DEVICE — STRIP, SKIN CLOSURE, STERI STRIP, REINFORCED, 0.5 X 4 IN

## (undated) DEVICE — COLLAR, CERVICAL, SUPPORT, NECK, MEDIUM/LONG

## (undated) DEVICE — COVER, CART, 45 X 27 X 48 IN, CLEAR

## (undated) DEVICE — DRESSING, NON-ADHERENT, OIL EMULSION, CURITY, 3 X 8 IN, STERILE

## (undated) DEVICE — ELECTRODE, CORKSCREW NEEDLE 1.5M LENGTH

## (undated) DEVICE — CLIPPER, SURGICAL BLADE ASSEMBLY, GENERAL PURPOSE, SINGLE USE

## (undated) DEVICE — DRAIN, WOUND, ROUND, W/TROCAR, HOLE PATTERN, 10 IN, MEDIUM/LARGE, 3/16 X 49 IN

## (undated) DEVICE — LEGEND, BALL FLUTED, 9 CM, 3 MM

## (undated) DEVICE — STAPLER, SKIN PROXIMATE, 35 WIDE

## (undated) DEVICE — DRAPE, SHEET, FAN FOLDED, HALF, 44 X 58 IN, DISPOSABLE, LF, STERILE

## (undated) DEVICE — LEGEND, LUB DIFFUSER PACK

## (undated) DEVICE — NEEDLE, ELECTRODE, SUBDERMAL, PAIRED, 2.0 LEAD, DISP

## (undated) DEVICE — TIP, SUCTION, FRAZIER, W/CONTROL VENT, 12 FR

## (undated) DEVICE — EVACUATOR, WOUND, CLOSED, 3 SPRING, 400 CC, Y CONNECTING TUBE

## (undated) DEVICE — SPONGE, GAUZE, XRAY DECT, 16 PLY, 4 X 4, W/MASTER DMT,STERILE

## (undated) DEVICE — SPONGE, HEMOSTATIC, GELATIN, SURGIFOAM, 8 X 12.5 CM X 10 MM

## (undated) DEVICE — SUTURE, VICRYL, 1, 27 IN, CT-1, VIOLET

## (undated) DEVICE — KIT, PATIENT CARE, JACKSON TABLE W/PRONE-SAFE HEADREST

## (undated) DEVICE — SUTURE, VICRYL, 2-0, 27 IN, FSL, UNDYED

## (undated) DEVICE — PIN, SKULL, MAYFIELD, ADULT

## (undated) DEVICE — LEGEND, BALL FLUTED, 9 CM, 5MM

## (undated) DEVICE — ELECTRODE, GROUND PLATE